# Patient Record
Sex: FEMALE | Race: BLACK OR AFRICAN AMERICAN | Employment: UNEMPLOYED | URBAN - METROPOLITAN AREA
[De-identification: names, ages, dates, MRNs, and addresses within clinical notes are randomized per-mention and may not be internally consistent; named-entity substitution may affect disease eponyms.]

---

## 2017-09-14 PROBLEM — F43.21 GRIEF REACTION: Status: ACTIVE | Noted: 2017-09-14

## 2017-09-14 PROBLEM — F33.1 MODERATE EPISODE OF RECURRENT MAJOR DEPRESSIVE DISORDER (HCC): Status: ACTIVE | Noted: 2017-09-14

## 2018-09-15 ENCOUNTER — HOSPITAL ENCOUNTER (EMERGENCY)
Age: 23
Discharge: HOME | End: 2018-09-15
Attending: EMERGENCY MEDICINE
Payer: COMMERCIAL

## 2018-09-15 VITALS
SYSTOLIC BLOOD PRESSURE: 135 MMHG | HEIGHT: 66 IN | OXYGEN SATURATION: 100 % | RESPIRATION RATE: 16 BRPM | WEIGHT: 151 LBS | BODY MASS INDEX: 24.27 KG/M2 | HEART RATE: 88 BPM | TEMPERATURE: 98.5 F | DIASTOLIC BLOOD PRESSURE: 84 MMHG

## 2018-09-15 DIAGNOSIS — N30.00 ACUTE CYSTITIS WITHOUT HEMATURIA: Primary | ICD-10-CM

## 2018-09-15 LAB
BACTERIA URNS QL MICRO: NORMAL /HPF
CASTS URNS QL MICRO: NORMAL /LPF
EPI CELLS #/AREA URNS HPF: NORMAL /HPF
HCG UR QL: POSITIVE
RBC #/AREA URNS HPF: NORMAL /HPF
WBC URNS QL MICRO: NORMAL /HPF

## 2018-09-15 PROCEDURE — 81003 URINALYSIS AUTO W/O SCOPE: CPT | Performed by: EMERGENCY MEDICINE

## 2018-09-15 PROCEDURE — 99284 EMERGENCY DEPT VISIT MOD MDM: CPT | Performed by: EMERGENCY MEDICINE

## 2018-09-15 PROCEDURE — 87491 CHLMYD TRACH DNA AMP PROBE: CPT

## 2018-09-15 PROCEDURE — 81015 MICROSCOPIC EXAM OF URINE: CPT

## 2018-09-15 PROCEDURE — 81025 URINE PREGNANCY TEST: CPT

## 2018-09-15 RX ORDER — NITROFURANTOIN 25; 75 MG/1; MG/1
100 CAPSULE ORAL 2 TIMES DAILY
Qty: 14 CAP | Refills: 0 | Status: SHIPPED | OUTPATIENT
Start: 2018-09-15 | End: 2018-09-22

## 2018-09-15 NOTE — ED TRIAGE NOTES
Patient reports painful urination with blood in urine last night. States that she had two menstrual periods last month, last week and second week in august. Patient unsure if she is pregnant.

## 2018-09-15 NOTE — ED PROVIDER NOTES
Patient is a 26 y.o. female presenting with hematuria. The history is provided by the patient. Blood in Urine The current episode started yesterday. The problem occurs every urination. The problem has been gradually improving. The quality of the pain is described as burning and stabbing. The pain is moderate. There has been no fever. She is sexually active. There is no history of pyelonephritis. Pertinent negatives include no chills, no sweats, no nausea, no vomiting, no discharge, no frequency, no hesitancy, no flank pain and no abdominal pain. It is unknown if the patient is pregnant. She has tried nothing for the symptoms. Her past medical history is significant for recurrent UTIs. Past medical history comments: bbacterial vaginosis. History reviewed. No pertinent past medical history. History reviewed. No pertinent surgical history. History reviewed. No pertinent family history. Social History Social History  Marital status: SINGLE Spouse name: N/A  
 Number of children: N/A  
 Years of education: N/A Occupational History  Not on file. Social History Main Topics  Smoking status: Current Every Day Smoker Packs/day: 0.25 Years: 4.00  Smokeless tobacco: Current User Comment: on patches  Alcohol use Yes Comment: occ beer once a month  Drug use: No  
 Sexual activity: No  
 
Other Topics Concern  Not on file Social History Narrative ALLERGIES: Review of patient's allergies indicates no known allergies. Review of Systems Constitutional: Negative for chills and fever. HENT: Negative for congestion, ear pain and rhinorrhea. Eyes: Negative for photophobia and discharge. Respiratory: Negative for cough and shortness of breath. Cardiovascular: Negative for chest pain and palpitations. Gastrointestinal: Negative for abdominal pain, constipation, diarrhea, nausea and vomiting. Endocrine: Negative for cold intolerance and heat intolerance. Genitourinary: Negative for dysuria, flank pain, frequency and hesitancy. Musculoskeletal: Negative for arthralgias, myalgias and neck pain. Skin: Negative for rash and wound. Allergic/Immunologic: Negative for environmental allergies and food allergies. Neurological: Negative for syncope and headaches. Hematological: Negative for adenopathy. Does not bruise/bleed easily. Psychiatric/Behavioral: Negative for dysphoric mood. The patient is not nervous/anxious. All other systems reviewed and are negative. Vitals:  
 09/15/18 0740 BP: 135/84 Pulse: 88 Resp: 16 Temp: 98.5 °F (36.9 °C) SpO2: 100% Weight: 68.5 kg (151 lb) Height: 5' 6\" (1.676 m) Physical Exam  
Constitutional: She is oriented to person, place, and time. She appears well-developed and well-nourished. She appears distressed. HENT:  
Head: Normocephalic and atraumatic. Mouth/Throat: Oropharynx is clear and moist.  
Eyes: Conjunctivae and EOM are normal. Pupils are equal, round, and reactive to light. Right eye exhibits no discharge. Left eye exhibits no discharge. No scleral icterus. Neck: Normal range of motion. Neck supple. No thyromegaly present. Cardiovascular: Normal rate, regular rhythm, normal heart sounds and intact distal pulses. Exam reveals no gallop and no friction rub. No murmur heard. Pulmonary/Chest: Effort normal and breath sounds normal. No respiratory distress. She has no wheezes. She exhibits no tenderness. Abdominal: Soft. Bowel sounds are normal. She exhibits no distension. There is no hepatosplenomegaly. There is no tenderness. There is no rebound and no guarding. No hernia. Musculoskeletal: Normal range of motion. She exhibits no edema or tenderness. Neurological: She is alert and oriented to person, place, and time. No cranial nerve deficit. She exhibits normal muscle tone. Skin: Skin is warm and dry. No rash noted. She is not diaphoretic. No erythema. Psychiatric: She has a normal mood and affect. Her behavior is normal. Judgment and thought content normal.  
Nursing note and vitals reviewed. MDM Number of Diagnoses or Management Options Acute cystitis without hematuria: new and requires workup Diagnosis management comments: Last menstrual period somewhere between 2 and 3 weeks ago. Not currently on any birth control 8:18 AM 
Urine pregnancy test positive. Today in the ER Amount and/or Complexity of Data Reviewed Clinical lab tests: ordered and reviewed Review and summarize past medical records: yes Risk of Complications, Morbidity, and/or Mortality Presenting problems: moderate Diagnostic procedures: low Management options: low General comments: Elements of this note have been dictated via voice recognition software. Text and phrases may be limited by the accuracy of the software. The chart has been reviewed, but errors may still be present. Patient Progress Patient progress: stable ED Course Procedures

## 2018-09-15 NOTE — DISCHARGE INSTRUCTIONS
Urinary Tract Infection in Pregnancy: Care Instructions  Your Care Instructions    A urinary tract infection, or UTI, is an infection of the bladder and other urinary structures. Most UTIs occur in the bladder. They often cause pain or burning when you urinate. UTI is the most common bacterial infection in pregnancy. If untreated, a UTI could lead to problems such as a kidney infection or  labor. Most UTIs can be cured with antibiotics. Your doctor will prescribe an antibiotic that is safe during pregnancy. Be sure to finish your medicine so that the infection does not spread to your kidneys. Follow-up care is a key part of your treatment and safety. Be sure to make and go to all appointments, and call your doctor if you are having problems. It's also a good idea to know your test results and keep a list of the medicines you take. How can you care for yourself at home? · Take your antibiotics as directed. Do not stop taking them just because you feel better. You need to take the full course of antibiotics. · Drink extra water and other fluids for the next day or two. This will help wash out the bacteria causing the infection. If you have kidney, heart, or liver disease and have to limit fluids, talk with your doctor before you increase the amount of fluids you drink. · Do not drink alcohol. · Urinate often. Try to empty your bladder each time. Preventing UTIs  · Drink plenty of fluids, enough so that your urine is light yellow or clear like water. This helps you urinate often, which clears bacteria from your system. If you have kidney, heart, or liver disease and have to limit fluids, talk with your doctor before you increase the amount of fluids you drink. · Urinate when you first have the urge. · Urinate right after you have sex. This is the best way for women to avoid UTIs. · When going to the bathroom, wipe from front to back to keep bacteria from entering the vagina or urethra.   When should you call for help? Call your doctor now or seek immediate medical care if:    · You have symptoms of a worse urinary tract infection. These may include:  ¨ Pain or burning when you urinate. ¨ A frequent need to urinate without being able to pass much urine. ¨ Pain in the flank, which is just below the rib cage and above the waist on either side of the back. ¨ Blood in your urine. ¨ A fever.    Watch closely for changes in your health, and be sure to contact your doctor if:    · You do not get better as expected. Where can you learn more? Go to http://naeem-dotty.info/. Enter M982 in the search box to learn more about \"Urinary Tract Infection in Pregnancy: Care Instructions. \"  Current as of: November 21, 2017  Content Version: 11.7  © 8604-4785 [employer]. Care instructions adapted under license by [employer] (which disclaims liability or warranty for this information). If you have questions about a medical condition or this instruction, always ask your healthcare professional. [place] [address] any warranty or liability for your use of this information.

## 2018-09-15 NOTE — ED NOTES
I have reviewed discharge instructions with the patient. The patient verbalized understanding. Patient left ED via Discharge Method: ambulatory to Home with self Opportunity for questions and clarification provided. Patient given 2 scripts. To continue your aftercare when you leave the hospital, you may receive an automated call from our care team to check in on how you are doing. This is a free service and part of our promise to provide the best care and service to meet your aftercare needs.  If you have questions, or wish to unsubscribe from this service please call [phone]. Thank you for Choosing our [facility] Department.

## 2018-09-18 LAB
C TRACH RRNA SPEC QL NAA+PROBE: NEGATIVE
N GONORRHOEA RRNA SPEC QL NAA+PROBE: NEGATIVE
SPECIMEN SOURCE: NORMAL

## 2018-11-12 PROBLEM — F33.1 MODERATE EPISODE OF RECURRENT MAJOR DEPRESSIVE DISORDER (HCC): Status: RESOLVED | Noted: 2017-09-14 | Resolved: 2018-11-12

## 2018-11-12 PROBLEM — Z86.59 H/O: DEPRESSION: Status: ACTIVE | Noted: 2018-11-12

## 2018-11-12 PROBLEM — M41.9 SCOLIOSIS: Status: ACTIVE | Noted: 2018-11-12

## 2018-11-12 PROBLEM — F43.21 GRIEF REACTION: Status: RESOLVED | Noted: 2017-09-14 | Resolved: 2018-11-12

## 2018-11-12 PROBLEM — D48.1: Status: ACTIVE | Noted: 2018-11-12

## 2018-11-12 PROBLEM — Z34.90 PREGNANCY: Status: ACTIVE | Noted: 2018-11-12

## 2018-11-14 PROBLEM — R76.8 POSITIVE ANA (ANTINUCLEAR ANTIBODY): Status: ACTIVE | Noted: 2018-11-14

## 2018-11-14 PROBLEM — Z36.82 NUCHAL TRANSLUCENCY OF FETUS ON PRENATAL ULTRASOUND: Status: ACTIVE | Noted: 2018-11-14

## 2018-11-14 PROBLEM — O35.2XX0 HEREDITARY DISEASE IN FAMILY POSSIBLY AFFECTING FETUS, AFFECTING MANAGEMENT OF MOTHER, ANTEPARTUM CONDITION OR COMPLICATION: Status: ACTIVE | Noted: 2018-11-14

## 2018-11-14 PROBLEM — Z82.69: Status: ACTIVE | Noted: 2018-11-12

## 2019-03-29 ENCOUNTER — HOSPITAL ENCOUNTER (INPATIENT)
Age: 24
LOS: 1 days | Discharge: ANOTHER HOSPITAL | DRG: 833 | End: 2019-03-30
Attending: OBSTETRICS & GYNECOLOGY | Admitting: OBSTETRICS & GYNECOLOGY
Payer: COMMERCIAL

## 2019-03-29 PROBLEM — O26.893 PELVIC PAIN IN ANTEPARTUM PERIOD IN THIRD TRIMESTER: Status: ACTIVE | Noted: 2019-03-29

## 2019-03-29 PROBLEM — R10.2 PELVIC PAIN IN ANTEPARTUM PERIOD IN THIRD TRIMESTER: Status: ACTIVE | Noted: 2019-03-29

## 2019-03-29 PROBLEM — O60.03 PRETERM LABOR IN THIRD TRIMESTER: Status: ACTIVE | Noted: 2019-03-29

## 2019-03-29 PROBLEM — O60.03 PRETERM LABOR IN THIRD TRIMESTER WITHOUT DELIVERY: Status: ACTIVE | Noted: 2019-03-29

## 2019-03-29 LAB
ALBUMIN SERPL-MCNC: 3.1 G/DL (ref 3.5–5)
ALBUMIN/GLOB SERPL: 0.9 {RATIO}
ALP SERPL-CCNC: 122 U/L (ref 50–130)
ALT SERPL-CCNC: 18 U/L (ref 12–65)
ANION GAP SERPL CALC-SCNC: 9 MMOL/L
AST SERPL-CCNC: 18 U/L (ref 15–37)
BACTERIA SPEC CULT: NORMAL
BASOPHILS # BLD: 0 K/UL (ref 0–0.2)
BASOPHILS NFR BLD: 0 % (ref 0–2)
BILIRUB SERPL-MCNC: 0.2 MG/DL (ref 0.2–1.1)
BUN SERPL-MCNC: 8 MG/DL (ref 6–23)
CALCIUM SERPL-MCNC: 8.9 MG/DL (ref 8.3–10.4)
CHLORIDE SERPL-SCNC: 108 MMOL/L (ref 98–107)
CO2 SERPL-SCNC: 20 MMOL/L (ref 21–32)
CREAT SERPL-MCNC: 0.5 MG/DL (ref 0.6–1)
DIFFERENTIAL METHOD BLD: ABNORMAL
EOSINOPHIL # BLD: 0 K/UL (ref 0–0.8)
EOSINOPHIL NFR BLD: 0 % (ref 0.5–7.8)
ERYTHROCYTE [DISTWIDTH] IN BLOOD BY AUTOMATED COUNT: 12.4 % (ref 11.9–14.6)
GLOBULIN SER CALC-MCNC: 3.5 G/DL (ref 2.3–3.5)
GLUCOSE SERPL-MCNC: 75 MG/DL (ref 65–100)
GLUCOSE, GLUUPC: NEGATIVE
HCT VFR BLD AUTO: 40.1 % (ref 35.8–46.3)
HGB BLD-MCNC: 13.8 G/DL (ref 11.7–15.4)
IMM GRANULOCYTES # BLD AUTO: 0.1 K/UL (ref 0–0.5)
IMM GRANULOCYTES NFR BLD AUTO: 1 % (ref 0–5)
KETONES UR-MCNC: NEGATIVE MG/DL
LYMPHOCYTES # BLD: 2.3 K/UL (ref 0.5–4.6)
LYMPHOCYTES NFR BLD: 18 % (ref 13–44)
MCH RBC QN AUTO: 31.4 PG (ref 26.1–32.9)
MCHC RBC AUTO-ENTMCNC: 34.4 G/DL (ref 31.4–35)
MCV RBC AUTO: 91.3 FL (ref 79.6–97.8)
MONOCYTES # BLD: 1.3 K/UL (ref 0.1–1.3)
MONOCYTES NFR BLD: 10 % (ref 4–12)
NEUTS SEG # BLD: 8.9 K/UL (ref 1.7–8.2)
NEUTS SEG NFR BLD: 70 % (ref 43–78)
NRBC # BLD: 0 K/UL (ref 0–0.2)
PLATELET # BLD AUTO: 163 K/UL (ref 150–450)
PMV BLD AUTO: 11.7 FL (ref 9.4–12.3)
POTASSIUM SERPL-SCNC: 3.9 MMOL/L (ref 3.5–5.1)
PROT SERPL-MCNC: 6.6 G/DL
PROT UR QL: NEGATIVE
RBC # BLD AUTO: 4.39 M/UL (ref 4.05–5.2)
SERVICE CMNT-IMP: NORMAL
SODIUM SERPL-SCNC: 137 MMOL/L (ref 136–145)
WBC # BLD AUTO: 12.7 K/UL (ref 4.3–11.1)

## 2019-03-29 PROCEDURE — 59025 FETAL NON-STRESS TEST: CPT

## 2019-03-29 PROCEDURE — 81002 URINALYSIS NONAUTO W/O SCOPE: CPT | Performed by: OBSTETRICS & GYNECOLOGY

## 2019-03-29 PROCEDURE — 86900 BLOOD TYPING SEROLOGIC ABO: CPT

## 2019-03-29 PROCEDURE — 87491 CHLMYD TRACH DNA AMP PROBE: CPT

## 2019-03-29 PROCEDURE — 87653 STREP B DNA AMP PROBE: CPT

## 2019-03-29 PROCEDURE — 65270000029 HC RM PRIVATE

## 2019-03-29 PROCEDURE — 99283 EMERGENCY DEPT VISIT LOW MDM: CPT

## 2019-03-29 PROCEDURE — 87081 CULTURE SCREEN ONLY: CPT

## 2019-03-29 PROCEDURE — 80053 COMPREHEN METABOLIC PANEL: CPT

## 2019-03-29 PROCEDURE — 85025 COMPLETE CBC W/AUTO DIFF WBC: CPT

## 2019-03-29 PROCEDURE — 74011250636 HC RX REV CODE- 250/636: Performed by: OBSTETRICS & GYNECOLOGY

## 2019-03-29 PROCEDURE — 87086 URINE CULTURE/COLONY COUNT: CPT

## 2019-03-29 RX ORDER — BETAMETHASONE SODIUM PHOSPHATE AND BETAMETHASONE ACETATE 3; 3 MG/ML; MG/ML
12 INJECTION, SUSPENSION INTRA-ARTICULAR; INTRALESIONAL; INTRAMUSCULAR; SOFT TISSUE EVERY 24 HOURS
Status: DISCONTINUED | OUTPATIENT
Start: 2019-03-29 | End: 2019-03-30 | Stop reason: HOSPADM

## 2019-03-29 RX ORDER — MAGNESIUM SULFATE HEPTAHYDRATE 40 MG/ML
2 INJECTION, SOLUTION INTRAVENOUS ONCE
Status: COMPLETED | OUTPATIENT
Start: 2019-03-29 | End: 2019-03-29

## 2019-03-29 RX ORDER — ZOLPIDEM TARTRATE 5 MG/1
5 TABLET ORAL
Status: DISCONTINUED | OUTPATIENT
Start: 2019-03-29 | End: 2019-03-30 | Stop reason: HOSPADM

## 2019-03-29 RX ORDER — SODIUM CHLORIDE, SODIUM LACTATE, POTASSIUM CHLORIDE, CALCIUM CHLORIDE 600; 310; 30; 20 MG/100ML; MG/100ML; MG/100ML; MG/100ML
125 INJECTION, SOLUTION INTRAVENOUS CONTINUOUS
Status: DISCONTINUED | OUTPATIENT
Start: 2019-03-29 | End: 2019-03-30 | Stop reason: HOSPADM

## 2019-03-29 RX ORDER — MAGNESIUM SULFATE HEPTAHYDRATE 40 MG/ML
2 INJECTION, SOLUTION INTRAVENOUS CONTINUOUS
Status: DISCONTINUED | OUTPATIENT
Start: 2019-03-29 | End: 2019-03-30 | Stop reason: HOSPADM

## 2019-03-29 RX ORDER — MAG HYDROX/ALUMINUM HYD/SIMETH 200-200-20
30 SUSPENSION, ORAL (FINAL DOSE FORM) ORAL
Status: DISCONTINUED | OUTPATIENT
Start: 2019-03-29 | End: 2019-03-30 | Stop reason: HOSPADM

## 2019-03-29 RX ORDER — ACETAMINOPHEN 325 MG/1
650 TABLET ORAL
Status: DISCONTINUED | OUTPATIENT
Start: 2019-03-29 | End: 2019-03-30 | Stop reason: HOSPADM

## 2019-03-29 RX ORDER — MAGNESIUM SULFATE HEPTAHYDRATE 40 MG/ML
4 INJECTION, SOLUTION INTRAVENOUS ONCE
Status: COMPLETED | OUTPATIENT
Start: 2019-03-29 | End: 2019-03-29

## 2019-03-29 RX ORDER — SODIUM CHLORIDE 0.9 % (FLUSH) 0.9 %
5-40 SYRINGE (ML) INJECTION AS NEEDED
Status: DISCONTINUED | OUTPATIENT
Start: 2019-03-29 | End: 2019-03-30 | Stop reason: HOSPADM

## 2019-03-29 RX ORDER — SODIUM CHLORIDE 0.9 % (FLUSH) 0.9 %
5-40 SYRINGE (ML) INJECTION EVERY 8 HOURS
Status: DISCONTINUED | OUTPATIENT
Start: 2019-03-29 | End: 2019-03-30 | Stop reason: HOSPADM

## 2019-03-29 RX ORDER — SODIUM CHLORIDE, SODIUM LACTATE, POTASSIUM CHLORIDE, CALCIUM CHLORIDE 600; 310; 30; 20 MG/100ML; MG/100ML; MG/100ML; MG/100ML
500 INJECTION, SOLUTION INTRAVENOUS CONTINUOUS
Status: DISPENSED | OUTPATIENT
Start: 2019-03-29 | End: 2019-03-29

## 2019-03-29 RX ORDER — DOCUSATE SODIUM 100 MG/1
100 CAPSULE, LIQUID FILLED ORAL 2 TIMES DAILY
Status: DISCONTINUED | OUTPATIENT
Start: 2019-03-30 | End: 2019-03-30 | Stop reason: HOSPADM

## 2019-03-29 RX ADMIN — SODIUM CHLORIDE, SODIUM LACTATE, POTASSIUM CHLORIDE, AND CALCIUM CHLORIDE 500 ML/HR: 600; 310; 30; 20 INJECTION, SOLUTION INTRAVENOUS at 22:20

## 2019-03-29 RX ADMIN — SODIUM CHLORIDE, SODIUM LACTATE, POTASSIUM CHLORIDE, AND CALCIUM CHLORIDE 125 ML/HR: 600; 310; 30; 20 INJECTION, SOLUTION INTRAVENOUS at 23:51

## 2019-03-29 RX ADMIN — MAGNESIUM SULFATE HEPTAHYDRATE 4 G: 40 INJECTION, SOLUTION INTRAVENOUS at 22:27

## 2019-03-29 RX ADMIN — BETAMETHASONE SODIUM PHOSPHATE AND BETAMETHASONE ACETATE 12 MG: 3; 3 INJECTION, SUSPENSION INTRA-ARTICULAR; INTRALESIONAL; INTRAMUSCULAR at 21:40

## 2019-03-29 RX ADMIN — MAGNESIUM SULFATE HEPTAHYDRATE 2 G/HR: 40 INJECTION, SOLUTION INTRAVENOUS at 23:13

## 2019-03-29 RX ADMIN — MAGNESIUM SULFATE IN WATER 2 G: 40 INJECTION, SOLUTION INTRAVENOUS at 23:01

## 2019-03-30 VITALS
HEART RATE: 109 BPM | OXYGEN SATURATION: 99 % | SYSTOLIC BLOOD PRESSURE: 149 MMHG | TEMPERATURE: 97.8 F | DIASTOLIC BLOOD PRESSURE: 73 MMHG

## 2019-03-30 LAB
ABO + RH BLD: NORMAL
BLOOD GROUP ANTIBODIES SERPL: NORMAL
SPECIMEN EXP DATE BLD: NORMAL

## 2019-03-30 NOTE — PROGRESS NOTES
SBAR report called to [facility] labor and delivery. Report given to Tri Rogers RN. Pt will go to Labor room 1 per [employer] Kendrick RN.

## 2019-03-30 NOTE — PROGRESS NOTES
Transport arrived. Thing[address]ra[address] 36 removed from soft non tender abd. Pt VS stable At this time 130/68, 98.2, 16, 99% room air, 106. Mag infusing @ 2g/hr and LR infusing @ 125/hr as ordered. Pumps unplugged and sent with pt. Transport will return to [facility]. Pastor catheter indwelling and draining clear straw colored urine when transferred to [facility]. 0117 pt on stretcher and leaving L and D with transport and her mother. All paperwork and medical records given to transport. Family educated on pts transfer and new location.

## 2019-03-30 NOTE — PROGRESS NOTES
SBAR received from Sydney Marquez RN. Care assumed. Mag bolus infusing. Pt in bed with family 2 bedside. POC reviewed, Pt voiced understanding. Shift assessment complete.  Will continue to monitor

## 2019-03-30 NOTE — H&P
History & Physical    Name: Fazal Ulloa MRN: [id number]  SSN: [id number]    YOB: 1995  Age: 22 y.o. Sex: female      Subjective:     Reason for Admission:  Pregnancy and  Labor    History of Present Illness: Ms. Sourav Cartagena is a 22 y.o.   with an estimated gestational age of 27w4d with Estimated Date of Delivery: 19. Patient complains of moderate contractions for 4 hours prior to arrival in [facility]. Pregnancy has been complicated by positive abelardo titer. . Patient denies chest pain, fever, headache , nausea and vomiting, right upper quadrant pain  , shortness of breath, swelling, vaginal bleeding  and vaginal leaking of fluid . OB History    Para Term  AB Living   1 0 0 0 0 0   SAB TAB Ectopic Molar Multiple Live Births   0 0 0 0 0 0      # Outcome Date GA Lbr Wally/2nd Weight Sex Delivery Anes PTL Lv   1 Current              Past Medical History:   Diagnosis Date    Anemia     Anxiety     Chlamydia     Depression     medication 2017-2017. doing well off meds    Headache     Scoliosis      No past surgical history on file. Social History     Occupational History    Not on file   Tobacco Use    Smoking status: Former Smoker     Packs/day: 0.25     Years: 4.00     Pack years: 1.00    Smokeless tobacco: Never Used    Tobacco comment: none since +pt   Substance and Sexual Activity    Alcohol use: No     Frequency: Never     Comment: occ beer once a month/ none since +pt    Drug use: Yes     Types: Marijuana     Comment: used marijuana 1 year ago-2017    Sexual activity: Yes     Partners: Male     Birth control/protection: None      Family History   Problem Relation Age of Onset    Hypertension Mother     Cancer Maternal Grandfather         bone    Breast Cancer Paternal Grandmother     Other Cousin         polydactyl       No Known Allergies  Prior to Admission medications    Medication Sig Start Date End Date Taking?  Authorizing Provider PNV Comb. No76-Iron,Carbonyl-FA (PNV 29-1) 29 mg iron- 1 mg tab Take 1 Tab by mouth daily. 3/12/19   Paul Wasserman MD      MEDS: PNV's    Review of Systems:  A comprehensive review of systems was negative except for that written in the History of Present Illness. Objective:     Vitals:    Vitals:    19 2050 19 2227   BP: 130/83 129/73   Pulse: 94 93   Temp: 97.8 °F (36.6 °C)       Temp (24hrs), Av.8 °F (36.6 °C), Min:97.8 °F (36.6 °C), Max:97.8 °F (36.6 °C)    BP  Min: 129/73  Max: 130/83     Physical Exam:  Patient without distress. Heart: Regular rate and rhythm  Lung: clear to auscultation throughout lung fields, no wheezes, no rales, no rhonchi and normal respiratory effort  Back: costovertebral angle tenderness absent  Abdomen: soft, nontender  Fundus: soft and non tender  Perineum: blood absent, amniotic fluid absent  Cervical Exam: 3 cm dilated    80% effaced    -2 station    Presenting Part: cephalic  Lower Extremities:  - Edema No   - Patellar Reflexes: 2+ bilaterally     Membranes:  Intact  Uterine Activity:  irreg every 2-5  Fetal Heart Rate:  Reactive     Skin- no rashes or changes  Psych- anxious    Lab/Data Review:  Recent Results (from the past 24 hour(s))   CBC WITH AUTOMATED DIFF    Collection Time: 19  9:28 PM   Result Value Ref Range    WBC 12.7 (H) 4.3 - 11.1 K/uL    RBC 4.39 4.05 - 5.2 M/uL    HGB 13.8 11.7 - 15.4 g/dL    HCT 40.1 35.8 - 46.3 %    MCV 91.3 79.6 - 97.8 FL    MCH 31.4 26.1 - 32.9 PG    MCHC 34.4 31.4 - 35.0 g/dL    RDW 12.4 11.9 - 14.6 %    PLATELET 156 278 - 174 K/uL    MPV 11.7 9.4 - 12.3 FL    ABSOLUTE NRBC 0.00 0.0 - 0.2 K/uL    DF AUTOMATED      NEUTROPHILS 70 43 - 78 %    LYMPHOCYTES 18 13 - 44 %    MONOCYTES 10 4.0 - 12.0 %    EOSINOPHILS 0 (L) 0.5 - 7.8 %    BASOPHILS 0 0.0 - 2.0 %    IMMATURE GRANULOCYTES 1 0.0 - 5.0 %    ABS. NEUTROPHILS 8.9 (H) 1.7 - 8.2 K/UL    ABS. LYMPHOCYTES 2.3 0.5 - 4.6 K/UL    ABS.  MONOCYTES 1.3 0.1 - 1.3 K/UL ABS. EOSINOPHILS 0.0 0.0 - 0.8 K/UL    ABS. BASOPHILS 0.0 0.0 - 0.2 K/UL    ABS. IMM. GRANS. 0.1 0.0 - 0.5 K/UL   TYPE & SCREEN    Collection Time: 19  9:28 PM   Result Value Ref Range    Crossmatch Expiration 2019     ABO/Rh(D) O POSITIVE     Antibody screen NEG    METABOLIC PANEL, COMPREHENSIVE    Collection Time: 19  9:28 PM   Result Value Ref Range    Sodium 137 136 - 145 mmol/L    Potassium 3.9 3.5 - 5.1 mmol/L    Chloride 108 (H) 98 - 107 mmol/L    CO2 20 (L) 21 - 32 mmol/L    Anion gap 9 mmol/L    Glucose 75 65 - 100 mg/dL    BUN 8 6 - 23 MG/DL    Creatinine 0.50 (L) 0.6 - 1.0 MG/DL    GFR est AA >60 >60 ml/min/1.73m2    GFR est non-AA >60 ml/min/1.73m2    Calcium 8.9 8.3 - 10.4 MG/DL    Bilirubin, total 0.2 0.2 - 1.1 MG/DL    ALT (SGPT) 18 12 - 65 U/L    AST (SGOT) 18 15 - 37 U/L    Alk. phosphatase 122 50 - 130 U/L    Protein, total 6.6 g/dL    Albumin 3.1 (L) 3.5 - 5.0 g/dL    Globulin 3.5 2.3 - 3.5 g/dL    A-G Ratio 0.9         Assessment and Plan:     Principal Problem:     labor in third trimester without delivery (3/29/2019)    Active Problems:    Pelvic pain in antepartum period in third trimester (3/29/2019)       labor in third trimester (3/29/2019)       -  Labor:  48 hour course of steroids to promote fetal lung maturity. Obtain Rectovaginal culture for Group B Streptococcal.   Start Magnesium sulfate for tocolysis if other tocolytics fail.   Continuous Tocodynamometer   Neonatology consult   GBS - negative  Urine culture, gc/chlam- sent/ pending  Vertex confirmed by US  Signed By:  Alejandro James MD     2019

## 2019-03-30 NOTE — PROGRESS NOTES
Transfer note: called to see patient after admission a few hours ago for this 23 yo  at 31 weeks and 5 days presenting with PTL and dilated to 3 cm on admission, now 4 cm after receiving 6 gram Mag bolus and 2grams/hour. Still having frequent UCs and pain score of 8. No SROM, and recheck of cervix 10 minutes ago confirms 4-5 cm. U/S shows cephalic presentation. Feel appropriate to transfer to [facility] for  care not available here. Pt informed and understands, agrees with transfer. Patient is deemed stable for transfer at this time. Accepting M doctor at [facility] is Dr Deuce Barrett. I spoke with her, history given and she agrees to accept care of patient. Patient counseled, examined and history reviewed for 25 minutes regarding her condition/diagnosis and disposition with greater than 50% of the time spent in this way, time spent with phone calls and transportation, paperwork etc included.

## 2019-03-30 NOTE — PROGRESS NOTES
Dr. Aicha Pete updated on pt status. SVE 4/80/-2.  Will come in to transfer pt to [facility]

## 2019-04-01 LAB
BACTERIA SPEC CULT: NORMAL
SERVICE CMNT-IMP: NORMAL

## 2019-04-02 LAB
BACTERIA SPEC CULT: NORMAL
SERVICE CMNT-IMP: NORMAL

## 2019-04-15 PROBLEM — O26.893 PELVIC PAIN IN ANTEPARTUM PERIOD IN THIRD TRIMESTER: Status: RESOLVED | Noted: 2019-03-29 | Resolved: 2019-04-15

## 2019-04-15 PROBLEM — O60.03 PRETERM LABOR IN THIRD TRIMESTER: Status: RESOLVED | Noted: 2019-03-29 | Resolved: 2019-04-15

## 2019-04-15 PROBLEM — R10.2 PELVIC PAIN IN ANTEPARTUM PERIOD IN THIRD TRIMESTER: Status: RESOLVED | Noted: 2019-03-29 | Resolved: 2019-04-15

## 2019-04-17 PROBLEM — O36.5930 POOR FETAL GROWTH AFFECTING MANAGEMENT OF MOTHER IN THIRD TRIMESTER: Status: ACTIVE | Noted: 2019-04-17

## 2019-04-17 PROBLEM — Z36.82 NUCHAL TRANSLUCENCY OF FETUS ON PRENATAL ULTRASOUND: Status: RESOLVED | Noted: 2018-11-14 | Resolved: 2019-04-17

## 2019-04-17 PROBLEM — O99.343 DEPRESSION AFFECTING PREGNANCY IN THIRD TRIMESTER, ANTEPARTUM: Status: ACTIVE | Noted: 2018-11-12

## 2019-04-17 PROBLEM — F32.A DEPRESSION AFFECTING PREGNANCY IN THIRD TRIMESTER, ANTEPARTUM: Status: ACTIVE | Noted: 2018-11-12

## 2019-05-07 ENCOUNTER — HOSPITAL ENCOUNTER (INPATIENT)
Age: 24
LOS: 2 days | Discharge: HOME | End: 2019-05-09
Attending: OBSTETRICS & GYNECOLOGY | Admitting: OBSTETRICS & GYNECOLOGY
Payer: COMMERCIAL

## 2019-05-07 ENCOUNTER — ANESTHESIA (OUTPATIENT)
Dept: LABOR AND DELIVERY | Age: 24
End: 2019-05-07
Payer: COMMERCIAL

## 2019-05-07 ENCOUNTER — ANESTHESIA EVENT (OUTPATIENT)
Dept: LABOR AND DELIVERY | Age: 24
End: 2019-05-07
Payer: COMMERCIAL

## 2019-05-07 DIAGNOSIS — O42.00 PREMATURE RUPTURE OF MEMBRANES WITH ONSET OF LABOR WITHIN 24 HOURS OF RUPTURE, UNSPECIFIED GESTATIONAL AGE: Primary | ICD-10-CM

## 2019-05-07 PROBLEM — O26.893 VAGINAL DISCHARGE DURING PREGNANCY IN THIRD TRIMESTER: Status: ACTIVE | Noted: 2019-05-07

## 2019-05-07 PROBLEM — O42.90 PROM (PREMATURE RUPTURE OF MEMBRANES): Status: ACTIVE | Noted: 2019-05-07

## 2019-05-07 PROBLEM — N89.8 VAGINAL DISCHARGE DURING PREGNANCY IN THIRD TRIMESTER: Status: ACTIVE | Noted: 2019-05-07

## 2019-05-07 LAB
ABO + RH BLD: NORMAL
ARTERIAL PATENCY WRIST A: ABNORMAL
ARTERIAL PATENCY WRIST A: ABNORMAL
BASE DEFICIT BLD-SCNC: 7 MMOL/L
BASE DEFICIT BLD-SCNC: 7 MMOL/L
BDY SITE: ABNORMAL
BDY SITE: ABNORMAL
BLOOD GROUP ANTIBODIES SERPL: NORMAL
BODY TEMPERATURE: 98.6
BODY TEMPERATURE: 98.6
CO2 BLD-SCNC: 21 MMOL/L
CO2 BLD-SCNC: 26 MMOL/L
COLLECT TIME,HTIME: 2007
COLLECT TIME,HTIME: 2007
ERYTHROCYTE [DISTWIDTH] IN BLOOD BY AUTOMATED COUNT: 12.8 % (ref 11.9–14.6)
GAS FLOW.O2 O2 DELIVERY SYS: ABNORMAL L/MIN
GAS FLOW.O2 O2 DELIVERY SYS: ABNORMAL L/MIN
HCO3 BLD-SCNC: 24.2 MMOL/L (ref 22–26)
HCO3 BLDV-SCNC: 19.3 MMOL/L (ref 23–28)
HCT VFR BLD AUTO: 40.6 % (ref 35.8–46.3)
HGB BLD-MCNC: 13.8 G/DL (ref 11.7–15.4)
MCH RBC QN AUTO: 30.9 PG (ref 26.1–32.9)
MCHC RBC AUTO-ENTMCNC: 34 G/DL (ref 31.4–35)
MCV RBC AUTO: 91 FL (ref 79.6–97.8)
NRBC # BLD: 0 K/UL (ref 0–0.2)
O2/TOTAL GAS SETTING VFR VENT: 21 %
O2/TOTAL GAS SETTING VFR VENT: 21 %
PCO2 BLDCO: 42 MMHG (ref 32–68)
PCO2 BLDCO: 72 MMHG (ref 32–68)
PH BLDCO: 7.13 [PH] (ref 7.15–7.38)
PH BLDCO: 7.27 [PH] (ref 7.15–7.38)
PLATELET # BLD AUTO: 136 K/UL (ref 150–450)
PMV BLD AUTO: 12 FL (ref 9.4–12.3)
PO2 BLDCO: 17 MMHG
PO2 BLDCO: 26 MMHG
RBC # BLD AUTO: 4.46 M/UL (ref 4.05–5.2)
SAO2 % BLD: 14 % (ref 95–98)
SAO2 % BLDV: 40 % (ref 65–88)
SERVICE CMNT-IMP: 1
SERVICE CMNT-IMP: 1
SERVICE CMNT-IMP: ABNORMAL
SERVICE CMNT-IMP: ABNORMAL
SPECIMEN EXP DATE BLD: NORMAL
SPECIMEN TYPE: ABNORMAL
SPECIMEN TYPE: ABNORMAL
WBC # BLD AUTO: 8.7 K/UL (ref 4.3–11.1)

## 2019-05-07 PROCEDURE — 74011250637 HC RX REV CODE- 250/637: Performed by: OBSTETRICS & GYNECOLOGY

## 2019-05-07 PROCEDURE — 75410000003 HC RECOV DEL/VAG/CSECN EA 0.5 HR

## 2019-05-07 PROCEDURE — 85027 COMPLETE CBC AUTOMATED: CPT

## 2019-05-07 PROCEDURE — 74011250636 HC RX REV CODE- 250/636: Performed by: OBSTETRICS & GYNECOLOGY

## 2019-05-07 PROCEDURE — 4A1HXCZ MONITORING OF PRODUCTS OF CONCEPTION, CARDIAC RATE, EXTERNAL APPROACH: ICD-10-PCS | Performed by: OBSTETRICS & GYNECOLOGY

## 2019-05-07 PROCEDURE — A4300 CATH IMPL VASC ACCESS PORTAL: HCPCS | Performed by: ANESTHESIOLOGY

## 2019-05-07 PROCEDURE — 77030014125 HC TY EPDRL BBMI -B: Performed by: ANESTHESIOLOGY

## 2019-05-07 PROCEDURE — 76060000078 HC EPIDURAL ANESTHESIA

## 2019-05-07 PROCEDURE — 74011000250 HC RX REV CODE- 250

## 2019-05-07 PROCEDURE — 77030018846 HC SOL IRR STRL H20 ICUM -A

## 2019-05-07 PROCEDURE — 77030011943

## 2019-05-07 PROCEDURE — 77030010848 HC CATH INTUTR PRSS KOLB -B

## 2019-05-07 PROCEDURE — 65270000029 HC RM PRIVATE

## 2019-05-07 PROCEDURE — 77030011945 HC CATH URIN INT ST MENT -A

## 2019-05-07 PROCEDURE — 75410000002 HC LABOR FEE PER 1 HR

## 2019-05-07 PROCEDURE — 74011250636 HC RX REV CODE- 250/636

## 2019-05-07 PROCEDURE — 99283 EMERGENCY DEPT VISIT LOW MDM: CPT

## 2019-05-07 PROCEDURE — 75410000000 HC DELIVERY VAGINAL/SINGLE

## 2019-05-07 PROCEDURE — 82803 BLOOD GASES ANY COMBINATION: CPT

## 2019-05-07 PROCEDURE — 86900 BLOOD TYPING SEROLOGIC ABO: CPT

## 2019-05-07 RX ORDER — OXYTOCIN/RINGER'S LACTATE 15/250 ML
250 PLASTIC BAG, INJECTION (ML) INTRAVENOUS ONCE
Status: ACTIVE | OUTPATIENT
Start: 2019-05-07 | End: 2019-05-07

## 2019-05-07 RX ORDER — SODIUM CHLORIDE 0.9 % (FLUSH) 0.9 %
5-40 SYRINGE (ML) INJECTION EVERY 8 HOURS
Status: DISCONTINUED | OUTPATIENT
Start: 2019-05-07 | End: 2019-05-07 | Stop reason: HOSPADM

## 2019-05-07 RX ORDER — ROPIVACAINE HYDROCHLORIDE 2 MG/ML
INJECTION, SOLUTION EPIDURAL; INFILTRATION; PERINEURAL
Status: DISCONTINUED | OUTPATIENT
Start: 2019-05-07 | End: 2019-05-07 | Stop reason: HOSPADM

## 2019-05-07 RX ORDER — FENTANYL CITRATE 50 UG/ML
INJECTION, SOLUTION INTRAMUSCULAR; INTRAVENOUS
Status: COMPLETED
Start: 2019-05-07 | End: 2019-05-07

## 2019-05-07 RX ORDER — ONDANSETRON 4 MG/1
4 TABLET, ORALLY DISINTEGRATING ORAL
Status: ACTIVE | OUTPATIENT
Start: 2019-05-07 | End: 2019-05-08

## 2019-05-07 RX ORDER — LIDOCAINE HYDROCHLORIDE 10 MG/ML
1 INJECTION INFILTRATION; PERINEURAL
Status: DISCONTINUED | OUTPATIENT
Start: 2019-05-07 | End: 2019-05-07 | Stop reason: HOSPADM

## 2019-05-07 RX ORDER — IBUPROFEN 800 MG/1
800 TABLET ORAL
Status: DISCONTINUED | OUTPATIENT
Start: 2019-05-07 | End: 2019-05-09 | Stop reason: HOSPADM

## 2019-05-07 RX ORDER — SIMETHICONE 80 MG
80 TABLET,CHEWABLE ORAL
Status: DISCONTINUED | OUTPATIENT
Start: 2019-05-07 | End: 2019-05-09 | Stop reason: HOSPADM

## 2019-05-07 RX ORDER — SODIUM CHLORIDE 0.9 % (FLUSH) 0.9 %
5-40 SYRINGE (ML) INJECTION AS NEEDED
Status: DISCONTINUED | OUTPATIENT
Start: 2019-05-07 | End: 2019-05-07 | Stop reason: HOSPADM

## 2019-05-07 RX ORDER — ONDANSETRON 2 MG/ML
4 INJECTION INTRAMUSCULAR; INTRAVENOUS
Status: DISCONTINUED | OUTPATIENT
Start: 2019-05-07 | End: 2019-05-07

## 2019-05-07 RX ORDER — DOCUSATE SODIUM 100 MG/1
100 CAPSULE, LIQUID FILLED ORAL 2 TIMES DAILY
Status: DISCONTINUED | OUTPATIENT
Start: 2019-05-08 | End: 2019-05-09 | Stop reason: HOSPADM

## 2019-05-07 RX ORDER — LIDOCAINE HYDROCHLORIDE AND EPINEPHRINE 20; 5 MG/ML; UG/ML
INJECTION, SOLUTION EPIDURAL; INFILTRATION; INTRACAUDAL; PERINEURAL AS NEEDED
Status: DISCONTINUED | OUTPATIENT
Start: 2019-05-07 | End: 2019-05-07 | Stop reason: HOSPADM

## 2019-05-07 RX ORDER — OXYTOCIN/RINGER'S LACTATE 30/500 ML
0-20 PLASTIC BAG, INJECTION (ML) INTRAVENOUS
Status: DISCONTINUED | OUTPATIENT
Start: 2019-05-07 | End: 2019-05-07

## 2019-05-07 RX ORDER — OXYCODONE HYDROCHLORIDE 5 MG/1
5 TABLET ORAL
Status: DISCONTINUED | OUTPATIENT
Start: 2019-05-07 | End: 2019-05-09 | Stop reason: HOSPADM

## 2019-05-07 RX ORDER — DEXTROSE, SODIUM CHLORIDE, SODIUM LACTATE, POTASSIUM CHLORIDE, AND CALCIUM CHLORIDE 5; .6; .31; .03; .02 G/100ML; G/100ML; G/100ML; G/100ML; G/100ML
125 INJECTION, SOLUTION INTRAVENOUS CONTINUOUS
Status: DISCONTINUED | OUTPATIENT
Start: 2019-05-07 | End: 2019-05-07 | Stop reason: HOSPADM

## 2019-05-07 RX ORDER — BUTORPHANOL TARTRATE 2 MG/ML
1 INJECTION INTRAMUSCULAR; INTRAVENOUS
Status: DISCONTINUED | OUTPATIENT
Start: 2019-05-07 | End: 2019-05-07 | Stop reason: HOSPADM

## 2019-05-07 RX ORDER — DIPHENHYDRAMINE HCL 25 MG
25 CAPSULE ORAL
Status: DISCONTINUED | OUTPATIENT
Start: 2019-05-07 | End: 2019-05-09 | Stop reason: HOSPADM

## 2019-05-07 RX ORDER — FENTANYL CITRATE 50 UG/ML
INJECTION, SOLUTION INTRAMUSCULAR; INTRAVENOUS AS NEEDED
Status: DISCONTINUED | OUTPATIENT
Start: 2019-05-07 | End: 2019-05-07 | Stop reason: HOSPADM

## 2019-05-07 RX ORDER — MINERAL OIL
120 OIL (ML) ORAL
Status: COMPLETED | OUTPATIENT
Start: 2019-05-07 | End: 2019-05-07

## 2019-05-07 RX ORDER — LIDOCAINE HYDROCHLORIDE 20 MG/ML
JELLY TOPICAL
Status: DISCONTINUED | OUTPATIENT
Start: 2019-05-07 | End: 2019-05-07 | Stop reason: HOSPADM

## 2019-05-07 RX ADMIN — LIDOCAINE HYDROCHLORIDE AND EPINEPHRINE 6 ML: 20; 5 INJECTION, SOLUTION EPIDURAL; INFILTRATION; INTRACAUDAL; PERINEURAL at 18:41

## 2019-05-07 RX ADMIN — ONDANSETRON 4 MG: 2 INJECTION INTRAMUSCULAR; INTRAVENOUS at 15:18

## 2019-05-07 RX ADMIN — FENTANYL CITRATE 100 MCG: 50 INJECTION, SOLUTION INTRAMUSCULAR; INTRAVENOUS at 18:41

## 2019-05-07 RX ADMIN — OXYTOCIN 2 MILLI-UNITS/MIN: 10 INJECTION, SOLUTION INTRAMUSCULAR; INTRAVENOUS at 19:20

## 2019-05-07 RX ADMIN — ROPIVACAINE HYDROCHLORIDE 10 ML/HR: 2 INJECTION, SOLUTION EPIDURAL; INFILTRATION; PERINEURAL at 15:34

## 2019-05-07 RX ADMIN — MINERAL OIL 120 ML: 471.95 OIL ORAL at 20:00

## 2019-05-07 RX ADMIN — SODIUM CHLORIDE, SODIUM LACTATE, POTASSIUM CHLORIDE, CALCIUM CHLORIDE, AND DEXTROSE MONOHYDRATE 125 ML/HR: 600; 310; 30; 20; 5 INJECTION, SOLUTION INTRAVENOUS at 09:03

## 2019-05-07 RX ADMIN — OXYTOCIN 2 MILLI-UNITS/MIN: 10 INJECTION, SOLUTION INTRAMUSCULAR; INTRAVENOUS at 12:40

## 2019-05-07 NOTE — PROGRESS NOTES
EPIDURAL PLACEMENT Dr Gerry Parikh at bedside at [phone]. Assisted pt to sitting up on bedside at 1515. Timeout completed at [phone] with MD, JHON and myself at bedside. Test dose given at 1530. Negative reaction. Pt assisted to lying back in right  tilt position. See anesthesia record for details. See vital sign flow sheet for BP. Tolerated procedure well.

## 2019-05-07 NOTE — H&P
History & Physical    Name: Hill Dominguez MRN: [id number]  SSN: [id number]    YOB: 1995  Age: 22 y.o. Sex: female      Chief c/o: loss of fluid  Subjective:     Estimated Date of Delivery: 19  OB History    Para Term  AB Living   1 0 0 0 0 0   SAB TAB Ectopic Molar Multiple Live Births   0 0 0 0 0 0      # Outcome Date GA Lbr Wally/2nd Weight Sex Delivery Anes PTL Lv   1 Current                Ms. Worley Holter is admitted with pregnancy at 42w4d for prom. Prenatal course was complicated by  labor. Please see prenatal records for details. Past Medical History:   Diagnosis Date    Anemia     Anxiety     Chlamydia     Depression     medication 2017-2017. doing well off meds    Headache     Scoliosis      History reviewed. No pertinent surgical history. Social History     Occupational History    Not on file   Tobacco Use    Smoking status: Former Smoker     Packs/day: 0.25     Years: 4.00     Pack years: 1.00    Smokeless tobacco: Never Used    Tobacco comment: none since +pt   Substance and Sexual Activity    Alcohol use: No     Frequency: Never     Comment: occ beer once a month/ none since +pt    Drug use: Yes     Types: Marijuana     Comment: used marijuana 1 year ago-2017    Sexual activity: Yes     Partners: Male     Birth control/protection: None     Family History   Problem Relation Age of Onset    Hypertension Mother     Cancer Maternal Grandfather         bone    Breast Cancer Paternal Grandmother     Other Cousin         polydactyl       No Known Allergies  Prior to Admission medications    Medication Sig Start Date End Date Taking? Authorizing Provider   PNV Comb. No76-Iron,Carbonyl-FA (PNV 29-1) 29 mg iron- 1 mg tab Take 1 Tab by mouth daily. 3/12/19  Yes Kiran Mcdowell MD        Review of Systems: A comprehensive review of systems was negative except for that written in the HPI.     Objective:     Vitals:  Vitals:    19 0759 19 0801   Temp:  99.3 °F (37.4 °C)   Weight: 95.3 kg (210 lb)    Height: 5' 6\" (1.676 m)         Physical Exam:  Patient without distress. Heart: Regular rate and rhythm  Lung: clear to auscultation throughout lung fields, no wheezes, no rales, no rhonchi and normal respiratory effort  Back: costovertebral angle tenderness absent  Abdomen: soft, nontender  Fundus: soft and non tender  Perineum: blood absent, amniotic fluid present  Cervical Exam: 4 cm dilated    90% effaced    -2 station    Presenting Part: cephalic  Lower Extremities:  - Edema No   - Patellar Reflexes: 2+ bilaterally  Membranes:   Premature Rupture of Membranes; Amniotic Fluid: clear fluid  Fetal Heart Rate: Reactive    Prenatal Labs:   Lab Results   Component Value Date/Time    ABO/Rh(D) O POSITIVE 2019 09:28 PM    Rubella, External immune 2018    GrBStrep, External Neg 2019    HBsAg, External negative 2018    HIV, External non reactive 2018    RPR, External non reactive 2018    Gonorrhea, External negative 2018    Chlamydia, External negative 2018    ABO,Rh O positive 2018         Assessment/Plan:     Active Problems:    Vaginal discharge during pregnancy in third trimester (2019)       premature rupture of membranes (PPROM) with onset of labor within 24 hours of rupture in third trimester, antepartum (2019)         Plan: 23 yo G1 at 37w 1d. Admit for PPROM. Group B Strep was negative. Pt received steroids at 31 weeks for  labor. Hx iugr which has resolved with out of work and increased calories.

## 2019-05-07 NOTE — ANESTHESIA PREPROCEDURE EVALUATION
Relevant Problems No relevant active problems Anesthetic History No history of anesthetic complications Review of Systems / Medical History Patient summary reviewed and pertinent labs reviewed Pulmonary Within defined limits Neuro/Psych Headaches and psychiatric history Cardiovascular Exercise tolerance: >4 METS 
  
GI/Hepatic/Renal 
Within defined limits Endo/Other Within defined limits Other Findings Comments: Scoliosis Denies Coagulapathies Physical Exam 
 
Airway Mallampati: II 
TM Distance: 4 - 6 cm Neck ROM: normal range of motion Mouth opening: Normal 
 
 Cardiovascular Rhythm: regular Rate: normal 
 
 
 
 Dental 
No notable dental hx Pulmonary Breath sounds clear to auscultation Abdominal 
GI exam deferred Other Findings Anesthetic Plan ASA: 2 Anesthesia type: epidural 
 
 
 
 
 
Anesthetic plan and risks discussed with: Patient and Family

## 2019-05-07 NOTE — PROGRESS NOTES
Labor Progress Note Patient seen, fetal heart rate and contraction pattern evaluated, patient examined. Patient Vitals for the past 1 hrs: 
 BP Pulse 05/07/19 1551 133/70 98  
05/07/19 1544 119/57 (!) 102  
05/07/19 1543 113/59 (!) 102  
05/07/19 1542 126/57 (!) 110  
05/07/19 1540 125/61 100  
05/07/19 1539 154/64 (!) 115  
05/07/19 1538 132/71 (!) 101  
05/07/19 1535 126/59 (!) 113  
05/07/19 1534 136/65 (!) 121  
05/07/19 1533 146/72 (!) 125  
05/07/19 1532 137/74 (!) 106  
05/07/19 1531 139/69 (!) 129 Physical Exam: 
Cervical Exam:  8 cm dilated 100% effaced   
-2 station Membranes:  prior SROM Uterine Activity: Frequency: Every 2-3 minutes Fetal Heart Rate: reassuring Assessment/Plan: 
Reassuring fetal status, Continue plan for vaginal delivery, will re evaluate and decide if need to restart pitocin

## 2019-05-07 NOTE — PROGRESS NOTES
Labor Progress Note Patient seen, fetal heart rate and contraction pattern evaluated, patient examined. No data found. Physical Exam: 
Membranes:  Spontaneous Rupture of Membranes; Amniotic Fluid: clear fluid Uterine Activity: Rare UC's Fetal Heart Rate: reassuring Assessment/Plan: 
Reassuring fetal status, will start pitocin with SROM earlier without onset of labor

## 2019-05-07 NOTE — PROGRESS NOTES
Labor Progress Note Patient seen, fetal heart rate and contraction pattern evaluated, patient examined. No data found. Physical Exam: 
Cervical Exam:  6 cm dilated 90% effaced   
-3 station Uterine Activity: difficult to monitor Fetal Heart Rate: reassuring with variable shaped deceleration Assessment/Plan: 
Reassuring fetal status, FSE and IUPC placed. Patient very uncomfortable with contractions and difficult to place IUPC  will watch progress.

## 2019-05-07 NOTE — PROGRESS NOTES
Here to triage via EMS, states her water broke about 5 am. Clear fluid. Has been 4 cms and was treated for ptl at [facility] with magnesium. Dr Ame Streeter at bs. Sve: \"generous 4\" with a very noticeable wet glove. Will admit to 428, sbar to [employer]. Pt states she does not want vaccines for herself or the baby

## 2019-05-07 NOTE — PROGRESS NOTES
Dr. Dalton Earcarmelina notified of current SVE; orders received to just notify him when she is complete.

## 2019-05-07 NOTE — ANESTHESIA PROCEDURE NOTES
Epidural Block Start time: 5/7/2019 3:25 PM 
End time: 5/7/2019 3:30 PM 
Performed by: Lucille Hayes MD 
Authorized by: Lucille Hayes MD  
 
Pre-Procedure Indication: at surgeon's request, post-op pain management, procedure for pain and labor epidural   
Preanesthetic Checklist: patient identified, risks and benefits discussed, anesthesia consent, site marked, patient being monitored, timeout performed and anesthesia consent Timeout Time: 15:25 Epidural:  
Patient position:  Seated Prep region:  Lumbar Prep: Chlorhexidine Location:  L3-4 Needle and Epidural Catheter:  
Needle Type:  Tuohy Needle Gauge:  17 G Injection Technique:  Loss of resistance using saline Attempts:  1 Catheter Size:  19 G Catheter at Skin Depth (cm):  11 Depth in Epidural Space (cm):  6 Events: no blood with aspiration, no cerebrospinal fluid with aspiration, no paresthesia and negative aspiration test   
Test Dose:  Lidocaine 1.5% w/ epi and negative Assessment:  
Catheter Secured:  Tegaderm and tape Insertion:  Uncomplicated Patient tolerance:  Patient tolerated the procedure well with no immediate complications

## 2019-05-07 NOTE — PROGRESS NOTES
Straight cath and SVE per flow sheet; bed linen changed; pt repositioned to left hip tilt; FSE off scalp; external fetal monitor in place and tracing; will continue to monitor.

## 2019-05-08 PROCEDURE — 74011250637 HC RX REV CODE- 250/637: Performed by: OBSTETRICS & GYNECOLOGY

## 2019-05-08 PROCEDURE — 65270000029 HC RM PRIVATE

## 2019-05-08 RX ADMIN — OXYCODONE HYDROCHLORIDE 5 MG: 5 TABLET ORAL at 07:54

## 2019-05-08 RX ADMIN — OXYCODONE HYDROCHLORIDE 5 MG: 5 TABLET ORAL at 21:04

## 2019-05-08 RX ADMIN — IBUPROFEN 800 MG: 800 TABLET ORAL at 11:44

## 2019-05-08 RX ADMIN — IBUPROFEN 800 MG: 800 TABLET ORAL at 17:47

## 2019-05-08 RX ADMIN — DOCUSATE SODIUM 100 MG: 100 CAPSULE, LIQUID FILLED ORAL at 07:55

## 2019-05-08 RX ADMIN — IBUPROFEN 800 MG: 800 TABLET ORAL at 05:59

## 2019-05-08 RX ADMIN — DOCUSATE SODIUM 100 MG: 100 CAPSULE, LIQUID FILLED ORAL at 17:47

## 2019-05-08 NOTE — PROGRESS NOTES
Assisted pt in ambulating to bathroom for the second time. Pt voided 800 ml. While cleaning up, pt passed golf ball sized clot. Helped pt in cleaning up and assisting back to bed. Assessed pt fundus. Fundus firm at U with no trickle. Educated pt to call out if pad is saturated within an hour or she passess another golf ball sized clot. Pt verbalizes understanding.

## 2019-05-08 NOTE — LACTATION NOTE

## 2019-05-08 NOTE — ROUTINE PROCESS
SBAR IN Report: Mother Verbal report received from Rosie Leroy (full name & credentials) on this patient, who is now being transferred from L&D (unit) for routine progression of care. The patient is not wearing a green \"Anesthesia-Duramorph\" band. Report consisted of patient's Situation, Background, Assessment and Recommendations (SBAR).  ID bands were compared with the identification form, and verified with the patient and transferring nurse. Information from the SBAR, Kardex and Procedure Summary and the [facility] Report was reviewed with the transferring nurse; opportunity for questions and clarification provided.

## 2019-05-08 NOTE — PROGRESS NOTES
Chart reviewed- first time parent with history of depression/anxiety.  met with family and provided education on [facility].  Family declined referral for home visit as she is currently receiving support through the [facility]. Patient does not have a PCP - list of PCPs provided.  did not inquire about patient's history of depression/anxiety as there were multiple visitors in the room. Patient was provided informational packet on  mood disorder education/resources. Family receptive to receiving information and denied any additional needs from . Family has 's contact information should any needs/questions arise. Carlton Collier [address] 35

## 2019-05-08 NOTE — ROUTINE PROCESS
SBAR OUT Report: Mother Verbal report given to Mitali Tellez RN (full name & credentials) on this patient, who is now being transferred to MIU (unit) for routine progression of care. The patient is not wearing a green \"Anesthesia-Duramorph\" band. Report consisted of patient's Situation, Background, Assessment and Recommendations (SBAR). [place] ID bands were compared with the identification form, and verified with the patient and receiving nurse. Information from the SBAR and the [address] [facility] Report was reviewed with the receiving nurse; opportunity for questions and clarification provided.

## 2019-05-08 NOTE — PROGRESS NOTES
Admission assessment complete as noted. Patient oriented to room and unit. Plan of care reviewed and patient verbalizes understanding. Questions encouraged and answered. Patent encouraged to call for needs or concerns. Safety Teaching reviewed:  
1. Hand hygiene prior to handling the infant. 2. Bracelets with matching numbers are placed on mother and infant 3. An infant security tag  [facility]) is placed on the infant's ankle and monitored 4. All OB nurses wear pink Employee badges - do not give your baby to anyone without proper identification. 5. Never leave the baby alone in the room. 6. The infant should be placed on their back to sleep. on a firm mattress. No toys should be placed in the crib. (safe sleep video offered to view) 7. Never shake the baby (video offered to view) 8. Infant fall prevention - do not sleep with the baby, and place the baby in the crib while ambulating. 5. Mother and Baby Care booklet given to Mother. Received call from Kofi with update on how his BP has been running over the weekend.   Patient reports that only time he had a bad BP was when he talked with us on Friday.   Otherwise readings are as follows:  Friday afternoon 145/86  Saturday- /77 /72  Sunday- /67 /75  Today was 125/73.   Patient feels good.  Has been making sure to drink enough water and that has alleviated his lightheadedness in the morning.   Overall feels better.

## 2019-05-08 NOTE — PROGRESS NOTES
Post-Partum Day Number 1 Progress Note Patient doing well post-partum without significant complaint. Voiding withour difficulty, normal lochia. Vitals:   
Patient Vitals for the past 8 hrs: 
 BP Temp Pulse Resp SpO2  
19 0836 107/55 98.4 °F (36.9 °C) (!) 101 18 98 % 19 0611  99.5 °F (37.5 °C)     
19 0555 113/71 100 °F (37.8 °C) (!) 104 17 95 % 19 0313  99.1 °F (37.3 °C)    Temp (24hrs), Av °F (37.2 °C), Min:98.3 °F (36.8 °C), Max:100 °F (37.8 °C) Vital signs stable, afebrile. Exam:  Patient without distress. Abdomen soft, fundus firm at level of umbilicus, nontender Lower extremities are negative for swelling, cords or tenderness. Lab/Data Review: All lab results for the last 24 hours reviewed. Assessment and Plan:  Patient appears to be having uncomplicated post-partum course. Continue routine perineal care and maternal education. Plan discharge tomorrow if no problems occur.

## 2019-05-08 NOTE — LACTATION NOTE
This note was copied from a baby's chart. Lactation visit. First time mom. 37 week infant. Baby has NOT latched well since birth per mom, sucked only a few times at delivery. Attempts only since then. Baby not yet 24 hours old. Mom attempting now. Baby tongue thrusting at rest. Everted nipples. Showed mom supportive hold in football hold on left breast. Baby would open minimally, no latch. Discussed pumping option since baby 37 week gestation and also has not latched since birth, mom agreeable. Pump set up with full instruction on use. Showed mom hands on pumping technique. Mom pumped 0. Tried to hand express post pumping, no colostrum returned. Will need to monitor very closely to see how colostrum volume progresses. Encouraged to continue with latch attempts but if baby not latching well, need to pump every 3 hours and feed colostrum. Will need to watch output and weight loss closely. RN updated.

## 2019-05-08 NOTE — PROGRESS NOTES
Pt. Resting skin to skin with infant. Plans on breastfeeding. Family at bedside visiting. Fundal checks being performed q15min. Pt. States pain 0/10.

## 2019-05-08 NOTE — L&D DELIVERY NOTE
Delivery Summary    Patient: Khang Dover MRN: [id number]  SSN: [id number]    YOB: 1995  Age: 22 y.o. Sex: female       Information for the patient's :  Cristiano Rendon [[id number]]       Labor Events:    Labor: No    Steroids: Full Course   Cervical Ripening Date/Time:       Cervical Ripening Type: None   Antibiotics During Labor: No   Rupture Identifier:      Rupture Date/Time: 2019 5:00 AM   Rupture Type: SROM   Amniotic Fluid Volume: Moderate    Amniotic Fluid Description:      Amniotic Fluid Odor: None    Induction: None       Induction Date/Time:        Indications for Induction:      Augmentation: Oxytocin   Augmentation Date/Time: 831:78 PM   Indications for Augmentation: Prolonged ROM   Labor complications: None       Additional complications:        Delivery Events:  Indications For Episiotomy:     Episiotomy: None   Perineal Laceration(s): None   Repaired:     Periurethral Laceration Location:      Repaired:     Labial Laceration Location:     Repaired:     Sulcal Laceration Location:     Repaired:     Vaginal Laceration Location:     Repaired:     Cervical Laceration Location:     Repaired:     Repair Suture: None   Number of Repair Packets: 0   Estimated Blood Loss (ml): 250ml     Delivery Date: 2019    Delivery Time: 8:07 PM  Delivery Type: Vaginal, Spontaneous  Sex:  Male    Gestational Age: 42w4d   Delivery Clinician: Brianna Butler  Living Status: Living   Delivery Location: L&D [facility]  One minute Five minutes Ten minutes   Skin color: 1   1        Heart rate: 2   2        Grimace: 2   2        Muscle tone: 2   2        Breathin   2        Totals: 9   9            Presentation: Vertex    Position: Left Occiput Anterior  Resuscitation Method:  Tactile Stimulation;Suctioning-bulb     Meconium Stained: None      Cord Information: 3 Vessels  Complications: None  Cord around:    Delayed cord clamping?  Yes  Cord clamped date/time:2019  8:08 PM  Disposition of Cord Blood: Lab    Blood Gases Sent?: Yes    Placenta:  Date/Time: 2019  8:18 PM  Removal: Spontaneous      Appearance: Normal;Intact      Measurements:  Birth Weight: 6 lb 8.6 oz (2.965 kg)      Birth Length: 1' 8.87\" (0.53 m)      Head Circumference: 1' 0.99\" (0.33 m)      Chest Circumference: 11.42\" (0.29 m)     Abdominal Girth: Other Providers:   Wanda EDGAR;BASIA DEALCRUZ;;ZACARIAS PIKE;, Obstetrician;Primary Nurse;Primary [place] Nurse;Charge Nurse;Scrub Tech           Group B Strep:   Lab Results   Component Value Date/Time    DudleyGULSHANluther External Neg 2019     Information for the patient's :  Ashutosh Pastranatte [[id number]]   No results found for: ABORH, PCTABR, PCTDIG, BILI, ABORHEXT, ABORH    No results for input(s): PCO2CB, PO2CB, HCO3I, SO2I, IBD, PTEMPI, SPECTI, PHICB, ISITE, IDEV, IALLEN in the last 72 hours.

## 2019-05-09 VITALS
SYSTOLIC BLOOD PRESSURE: 115 MMHG | BODY MASS INDEX: 33.75 KG/M2 | WEIGHT: 210 LBS | HEART RATE: 101 BPM | OXYGEN SATURATION: 99 % | RESPIRATION RATE: 18 BRPM | HEIGHT: 66 IN | DIASTOLIC BLOOD PRESSURE: 68 MMHG | TEMPERATURE: 98.3 F

## 2019-05-09 PROCEDURE — 74011250637 HC RX REV CODE- 250/637: Performed by: OBSTETRICS & GYNECOLOGY

## 2019-05-09 RX ORDER — OXYCODONE HYDROCHLORIDE 5 MG/1
5 TABLET ORAL
Qty: 10 TAB | Refills: 0 | Status: SHIPPED | OUTPATIENT
Start: 2019-05-09 | End: 2019-05-12

## 2019-05-09 RX ORDER — IBUPROFEN 800 MG/1
800 TABLET ORAL
Qty: 35 TAB | Refills: 0 | Status: SHIPPED | OUTPATIENT
Start: 2019-05-09 | End: 2019-05-24 | Stop reason: ALTCHOICE

## 2019-05-09 RX ADMIN — IBUPROFEN 800 MG: 800 TABLET ORAL at 08:18

## 2019-05-09 NOTE — LACTATION NOTE
This note was copied from a baby's chart. Individualized Feeding Plan for Breastfeeding Lactation Services [phone] As much as possible, hold your baby on your chest so babys bare skin is against your bare skin with a blanket covering babys back, especially 30 minutes before it is time for baby to eat. Watch for early feeding cues such as, licking lips, sucking motions, rooting, hands to mouth. Crying is a late feeding cue. Feed your baby at least 8 times in 24 hours, or more if your baby is showing feeding cues. If baby is sleepy put baby skin to skin and watch for hunger cues. To rouse baby: unwrap, undress, massage hands, feet, & back, change diaper, gently change babys position from lying to sitting. 15-20 minutes on the first breast of active breastfeeding is considered a good feeding. Good, active breastfeeding is when baby is alert, tugging the nipple, their ear may move, and you can hear swallows. Allow baby to finish the first side before changing sides. Sleeping at the breast or only brief, light sucks should not be considered a good, full breastfeed. At each feeding: 
__x__1. Do Suck Practice on finger before each feeding until sucking pattern is smooth. Try using index finger. Nail down towards tongue. __x__2. Hand Express for a few minutes prior to latching to help start milk flow. __x__3. Baby needs to NURSE WELL x 15-20 minutes on at least first breast, burp and offer 2nd breast at every feeding. If no sustained latch only attempt at breast for 10 minutes. If baby does NOT latch on and feed well on at least one side, you should:  
__x__4. Double pump for 15 minutes with breast massage and compression. Hand express for an additional 2-3 minutes per side. Pump after each feeding attempt or poor feeding, up to 8 times per day. If you are not putting baby to the breast you need to pump 8 times a day. Pump every at least every 3 hours. __x__5. Give baby all of the breast milk you obtain from pumping first and then supplement with formula to ensure baby has a full feeding. AVERAGE INTAKES OF COLOSTRUM BY HEALTHY  INFANTS: 
Time  Day Intake (ml/feed)  Based on 8 feedings per day. 1st 24 hrs  1 2-10 ml 
24-48 hrs  2 5-15 ml 
48-72 hrs  3 15-30 ml (0.5-1 oz) Amount is per feeding 72-96 hrs  4 30-45 ml (1-1.5oz) 5-6       45-60 ml (1.5-2oz) 7          60ml (2oz) By day 7, baby will need 60 ml or 2 oz at each feeding based on 8 feedings per day & babys weight. (1oz = 30ml). Total milk volume needed in 24 hours by Day 7 is 15.5-17.5 oz per day based on baby's birthweight of 6-9. The more often baby eats, the less volume they need per feeding. If baby is eating more often than the minimum of 8 times per day, they may take less per feeding. Comments: Use feeding plan until follow up with pediatrician. Continue to attempt at the breast for most feeds. Pump every 3 hours if no latch. Give all pumped colostrum/breastmilk at each feeding plus formula supplement to ensure full feeding.

## 2019-05-09 NOTE — PROGRESS NOTES
Post-Partum Day 2  Number  Progress Note Patient doing well  without significant complaints. Pain controlled on current medication. Voiding without difficulty, normal lochia. Vitals:   
Visit Vitals /68 (BP 1 Location: Left arm, BP Patient Position: At rest) Pulse (!) 101 Temp 98.3 °F (36.8 °C) Resp 18 Ht 5' 6\" (1.676 m) Wt 210 lb (95.3 kg) LMP 08/20/2018 SpO2 99% Breastfeeding? Unknown BMI 33.89 kg/m² Vital signs stable, afebrile. Exam:  Patient without distress. Heart rrr 
Lungs cta b&s Abdomen soft, fundus firm at level of umbilicus, nontender. Lower extremities are negative for swelling, cords or tenderness. Lab Results Component Value Date/Time ABO Group O 11/12/2018 10:17 AM  
 Rh (D) Positive 11/12/2018 10:17 AM  
 ABO/Rh(D) Shyanne Quinonezk POSITIVE 05/07/2019 08:42 AM  
 
  
Lab Results Component Value Date/Time ABO/Rh(D) O POSITIVE 05/07/2019 08:42 AM  
 Antibody screen NEG 05/07/2019 08:42 AM  
 Antibody screen, External negative 11/12/2018 Rubella, External immune 11/12/2018 GrBStrep, External Neg 03/29/2019 ABO,Rh O positive 11/12/2018 Lab Results Component Value Date/Time HGB 13.8 05/07/2019 08:42 AM  
 Hgb, External 12.6 03/06/2019 Assessment and Plan:  Patient appears to be having uncomplicated post-partum course. Continue routine post-delivery care and maternal education. Breastfeeding. Will discharge home.

## 2019-05-09 NOTE — PROGRESS NOTES
Shift assessment complete as noted. Patient denies needs. Questions encouraged and answered. Encouraged to call for needs or concerns. Verbalizes understanding. Pt resting. Encouraged to call RN as needed.

## 2019-05-09 NOTE — LACTATION NOTE
Mom and baby are going home today. Continue to offer the breast without restriction. Mom's milk should be fully in over the next few days. Reviewed engorgement precautions. Hand Expression has been demoed and written hand-out reviewed. As milk comes in baby will be more alert at the breast and swallows will be more obvious. Breasts may feel softer once baby has finished nursing. Baby should be back to birth weight by 3weeks of age. And then gain on average 1 oz per day for the next 2-3 months. Reviewed babies should be exclusively breastfeeding for the first 6 months and that breastfeeding should continue after introduction of appropriate complimentary foods after 6 months. Initial output should be at least 1 wet and 1 bowel movement for each day old baby is. By day 5-7 once milk is fully in baby will consistently have 6 or more soaking wet diapers and about 4 bowel movement. Some babies have a bowel movement with every feeding and some have 1-3 large bowel movements each day. Inadequate output may indicate inadequate feedings and should be reported to your Pediatrician. Bowel habits may change as baby gets older. Encouraged follow-up at Pediatrician in 1-2 days, no later than 1 week of age. Call [facility] for any questions as needed or to set up an OP visit. OP phone calls are returned within 24 hours. Community Breastfeeding Resource List given.

## 2019-05-09 NOTE — LACTATION NOTE
This note was copied from a baby's chart. Spoke with mother concerning infants feeding. Mother is still attempting to feed infant with no success in latch. Mother continues to pump after each feed with no colostrum obtained. Educated mother to continue to monitor infants output. Mother verbalizes that infant just had a void at 2100 and a stool at 2300. Infant has only lost 3 % of weight tonight. Educated mother that if infant becomes inconsolable, she has option to formula feed infant via curve tip syringe feed until milk comes in. Mother understands but wants to continue to attempt breastfeeding and pumping at this time.

## 2019-05-09 NOTE — LACTATION NOTE
Lactation visit. In to check on feeds. Mom reports continued latch attempts but has not successfully latched at all since birth. Mom pumping, still no colostrum, pumped 2 drops for the first time this morning. Baby over 39 hours old now, has had not measurable intake since birth. Discussed feeding needs. Mom to continue with latch attempts and pumping diligently but instructed mom that baby would need formula supplement at this time to ensure baby can feed adequately prior to discharge. Mom reluctant but agreeable. Baby just circumcised and fussy. Assisted mom with bottle feeding technique. Baby took 5ml well. Then became very fussy again. Offered bottle again but baby too fussy. Mom to soothe infant and then retry feeding again once baby more calm. Feeding plan given and reviewed, copy for home given. Discussed pumping every 3 hours. Mom has double pump for home use. No obvious risk factors for delayed lactogenesis II, will need close montoring and follow up of milk supply. Discussed outpatient visit next week if needed. Questions answered. Watch output. RN updated.

## 2019-05-09 NOTE — PROGRESS NOTES
Bedside report received from Titi Arias RN. Patient care assumed. Pt changing infant's diaper. Denies any needs. Spouse at bedside.

## 2019-05-09 NOTE — DISCHARGE INSTRUCTIONS
Discharge instruction to follow: Activity: Pelvis rest for 6 weeks     No heavy lifting over 15 lbs for 2 weeks     No driving for 2 weeks     No push/pull motion such as sweeping or vacuuming for 2 weeks     No tub baths for 6 weeks    Continue using the chloe-bottle after each void or bowel movement. If using sitz bath continue until comfortable stopping. Change sanitary pad after each urination or bowel movement. Call MD for the following:      Fever over 101 F; pain not relieved by medication; foul smelling vaginal discharge or an increase in vaginal bleeding. Take medication as prescribed. Follow up with MD as order. DISCHARGE SUMMARY from Nurse    PATIENT INSTRUCTIONS:    After general anesthesia or intravenous sedation, for 24 hours or while taking prescription Narcotics:  · Limit your activities  · Do not drive and operate hazardous machinery  · Do not make important personal or business decisions  · Do  not drink alcoholic beverages  · If you have not urinated within 8 hours after discharge, please contact your surgeon on call. Report the following to your surgeon:  · Excessive pain, swelling, redness or odor of or around the surgical area  · Temperature over 100.5  · Nausea and vomiting lasting longer than 4 hours or if unable to take medications  · Any signs of decreased circulation or nerve impairment to extremity: change in color, persistent  numbness, tingling, coldness or increase pain  · Any questions    What to do at Home:  Recommended activity: Activity as tolerated,     *  Please give a list of your current medications to your Primary Care Provider. *  Please update this list whenever your medications are discontinued, doses are      changed, or new medications (including over-the-counter products) are added. *  Please carry medication information at all times in case of emergency situations.     These are general instructions for a healthy lifestyle:    No smoking/ No tobacco products/ Avoid exposure to second hand smoke  Surgeon General's Warning:  Quitting smoking now greatly reduces serious risk to your health. Obesity, smoking, and sedentary lifestyle greatly increases your risk for illness    A healthy diet, regular physical exercise & weight monitoring are important for maintaining a healthy lifestyle    You may be retaining fluid if you have a history of heart failure or if you experience any of the following symptoms:  Weight gain of 3 pounds or more overnight or 5 pounds in a week, increased swelling in our hands or feet or shortness of breath while lying flat in bed. Please call your doctor as soon as you notice any of these symptoms; do not wait until your next office visit. Recognize signs and symptoms of STROKE:    F-face looks uneven    A-arms unable to move or move unevenly    S-speech slurred or non-existent    T-time-call 911 as soon as signs and symptoms begin-DO NOT go       Back to bed or wait to see if you get better-TIME IS BRAIN. Warning Signs of HEART ATTACK     Call 911 if you have these symptoms:   Chest discomfort. Most heart attacks involve discomfort in the center of the chest that lasts more than a few minutes, or that goes away and comes back. It can feel like uncomfortable pressure, squeezing, fullness, or pain.  Discomfort in other areas of the upper body. Symptoms can include pain or discomfort in one or both arms, the back, neck, jaw, or stomach.  Shortness of breath with or without chest discomfort.  Other signs may include breaking out in a cold sweat, nausea, or lightheadedness. Don't wait more than five minutes to call 911 - MINUTES MATTER! Fast action can save your life. Calling 911 is almost always the fastest way to get lifesaving treatment. Emergency Medical Services staff can begin treatment when they arrive -- up to an hour sooner than if someone gets to the hospital by car.      The discharge information has been reviewed with the patient. The patient verbalized understanding. Discharge medications reviewed with the patient and appropriate educational materials and side effects teaching were provided. ___________________________________________________________________________________________________________________________________  Patient Education        Vaginal Childbirth: Care Instructions  Your Care Instructions    Your body will slowly heal in the next few weeks. It is easy to get too tired and overwhelmed during the first weeks after your baby is born. Changes in your hormones can shift your mood without warning. You may find it hard to meet the extra demands on your energy and time. Take it easy on yourself. Follow-up care is a key part of your treatment and safety. Be sure to make and go to all appointments, and call your doctor if you are having problems. It's also a good idea to know your test results and keep a list of the medicines you take. How can you care for yourself at home? · Vaginal bleeding and cramps  ? After delivery, you will have a bloody discharge from the vagina. This will turn pink within a week and then white or yellow after about 10 days. It may last for 2 to 4 weeks or longer, until the uterus has healed. Use pads instead of tampons until you stop bleeding. ? Do not worry if you pass some blood clots, as long as they are smaller than a golf ball. If you have a tear or stitches in your vaginal area, change the pad at least every 4 hours to prevent soreness and infection. ? You may have cramps for the first few days after childbirth. These are normal and occur as the uterus shrinks to normal size. Take an over-the-counter pain medicine, such as acetaminophen (Tylenol), ibuprofen (Advil, Motrin), or naproxen (Aleve), for cramps. Read and follow all instructions on the label. Do not take aspirin, because it can cause more bleeding.   ? Do not take two or more pain medicines at the same time unless the doctor told you to. Many pain medicines have acetaminophen, which is Tylenol. Too much acetaminophen (Tylenol) can be harmful. · Stitches  ? If you have stitches, they will dissolve on their own and do not need to be removed. Follow your doctor's instructions for cleaning the stitched area. ? Put ice or a cold pack on your painful area for 10 to 20 minutes at a time, several times a day, for the first few days. Put a thin cloth between the ice and your skin. ? Sit in a few inches of warm water (sitz bath) 3 times a day and after bowel movements. The warm water helps with pain and itching. If you do not have a tub, a warm shower might help. · Breast fullness  ? Your breasts may overfill (engorge) in the first few days after delivery. To help milk flow and to relieve pain, warm your breasts in the shower or by using warm, moist towels before nursing. ? If you are not nursing, do not put warmth on your breasts or touch your breasts. Wear a tight bra or sports bra and use ice until the fullness goes away. This usually takes 2 to 3 days. ? Put ice or a cold pack on your breast after nursing to reduce swelling and pain. Put a thin cloth between the ice and your skin. · Activity  ? Eat a balanced diet. Do not try to lose weight by cutting calories. Keep taking your prenatal vitamins, or take a multivitamin. ? Get as much rest as you can. Try to take naps when your baby sleeps during the day. ? Get some exercise every day. But do not do any heavy exercise until your doctor says it is okay. ? Wait until you are healed (about 4 to 6 weeks) before you have sexual intercourse. Your doctor will tell you when it is okay to have sex. ? Talk to your doctor about birth control. You can get pregnant even before your period returns. Also, you can get pregnant while you are breastfeeding. · Mental health  ? It is normal to have some sadness, anxiety, sleeplessness, and mood swings after you go home.  If you feel upset or hopeless for more than a few days or are having trouble doing the things you need to do, talk to your doctor. · Constipation and hemorrhoids  ? Drink plenty of fluids, enough so that your urine is light yellow or clear like water. If you have kidney, heart, or liver disease and have to limit fluids, talk with your doctor before you increase the amount of fluids you drink. ? Eat plenty of fiber each day. Have a bran muffin or bran cereal for breakfast, and try eating a piece of fruit for a mid-afternoon snack. ? For painful, itchy hemorrhoids, put ice or a cold pack on the area several times a day for 10 minutes at a time. Follow this by putting a warm compress on the area for another 10 to 20 minutes or by sitting in a shallow, warm bath. When should you call for help? Call 911 anytime you think you may need emergency care. For example, call if:    · You passed out (lost consciousness).    Call your doctor now or seek immediate medical care if:    · You have severe vaginal bleeding.     · You are dizzy or lightheaded, or you feel like you may faint.     · You have a fever.     · You have new or more pain in your belly or pelvis.    Watch closely for changes in your health, and be sure to contact your doctor if:    · Your vaginal bleeding seems to be getting heavier.     · You have new or worse vaginal discharge.     · You feel sad, anxious, or hopeless for more than a few days.     · You do not get better as expected. Where can you learn more? Go to http://naeem-dotty.info/. Enter G261 in the search box to learn more about \"Vaginal Childbirth: Care Instructions. \"  Current as of: September 5, 2018  Content Version: 11.9  © 1824-6887 [employer]. Care instructions adapted under license by [employer] (which disclaims liability or warranty for this information).  If you have questions about a medical condition or this instruction, always ask your healthcare professional. [address] any warranty or liability for your use of this information.

## 2019-05-09 NOTE — DISCHARGE SUMMARY
Obstetrical Discharge Summary     Name: Jairo Ortiz MRN: [id number]  SSN: [id number]    YOB: 1995  Age: 22 y.o. Sex: female      Allergies: Patient has no known allergies. Admit Date: 2019    Discharge Date: 2019     Admitting Physician: Maliha Melton MD     Attending Physician:  Hetal De Jesus MD     * Admission Diagnoses: Vaginal discharge during pregnancy in third trimester [O26.893, N89.8];  premature rupture of membranes (PPROM) with onset of labor within 24 hours of rupture in third trimester, antepartum [O42.013]    * Discharge Diagnoses:   Information for the patient's :  Triston Olmstead [[id number]]   Delivery of a 6 lb 8.6 oz (2.965 kg) male infant via Vaginal, Spontaneous on 2019 at 8:07 PM  by . Apgars were 9 and 9. Additional Diagnoses:   Hospital Problems as of 2019 Date Reviewed: 2019          Codes Class Noted - Resolved POA    Vaginal discharge during pregnancy in third trimester ICD-10-CM: O26.893, N89.8  ICD-9-CM: 646.83, 623.5  2019 - Present Unknown        * (Principal) PROM (premature rupture of membranes) ICD-10-CM: O42.90  ICD-9-CM: 658.10  2019 - Present              Lab Results   Component Value Date/Time    ABO/Rh(D) O POSITIVE 2019 08:42 AM    Rubella, External immune 2018    GrBStrep, External Neg 2019    ABO,Rh O positive 2018      There is no immunization history on file for this patient.     * Procedures:  Vaginal delivery     [place]  Depression Scale  I have been able to laugh and see the funny side of things: As much as I always could  I have looked forward with enjoyment to things: As much as I ever did  I have blamed myself unnecessarily when things went wrong: Not very often  I have been anxious or worried for no good reason: Hardly ever  I have felt scared or panicky for no very good reason: No, not at all  Things have been getting on top of me: No, I have been coping as well as ever  I have been so unhappy that I have had difficulty sleeping: No, not at all  I have felt sad or miserable: No, not at all  I have been so unhappy that I have been crying: No, never  The thought of harming myself has occurred to me: Never  Total Score: 2    * Discharge Condition: stable    * Hospital Course: Normal hospital course following the delivery. * Disposition: Home    Discharge Medications:   Current Discharge Medication List      START taking these medications    Details   ibuprofen (MOTRIN) 800 mg tablet Take 1 Tab by mouth every eight (8) hours as needed for Pain. Qty: 35 Tab, Refills: 0      oxyCODONE IR (ROXICODONE) 5 mg immediate release tablet Take 1 Tab by mouth every four (4) hours as needed for Pain for up to 3 days. Max Daily Amount: 30 mg.  Qty: 10 Tab, Refills: 0    Associated Diagnoses: Premature rupture of membranes with onset of labor within 24 hours of rupture, unspecified gestational age         [de-identified] these medications which have NOT CHANGED    Details   PNV Comb. No76-Iron,Carbonyl-FA (PNV 29-1) 29 mg iron- 1 mg tab Take 1 Tab by mouth daily. Qty: 30 Tab, Refills: 11             * Follow-up Care/Patient Instructions:   Activity: No sex for 6 weeks  Diet: Regular Diet  Wound Care: As directed    Follow-up Information     Follow up With Specialties Details Why Contact Info    Unknown, Provider    Patient not available to ask

## 2019-06-17 PROBLEM — O42.90 PROM (PREMATURE RUPTURE OF MEMBRANES): Status: RESOLVED | Noted: 2019-05-07 | Resolved: 2019-06-17

## 2019-06-17 PROBLEM — N89.8 VAGINAL DISCHARGE DURING PREGNANCY IN THIRD TRIMESTER: Status: RESOLVED | Noted: 2019-05-07 | Resolved: 2019-06-17

## 2019-06-17 PROBLEM — O26.893 VAGINAL DISCHARGE DURING PREGNANCY IN THIRD TRIMESTER: Status: RESOLVED | Noted: 2019-05-07 | Resolved: 2019-06-17

## 2019-07-11 ENCOUNTER — TELEPHONE (OUTPATIENT)
Dept: CASE MANAGEMENT | Age: 24
End: 2019-07-11

## 2019-07-11 NOTE — TELEPHONE ENCOUNTER
Phone call to patient at [phone] at the request of OB due to postpartum depression. Introduction made by .  explained purpose of phone call; patient agreeable to continue conversation. Patient states that the first 3-4 weeks postpartum \"were great,\" but she noticed a decline in her mood approximately 2 weeks ago. Patient denies any depression or anger issues, but is experiencing a significant amount of anxiety. Patient was prescribed Zoloft by OB and plans to start taking this medication tomorrow. Patient has taken an antidepressant in the past when her grandfather passed away. She required this medication for 6 months and also sought the support of a counselor (M[facility] in [facility]). Discussed current stressors: patient's grandmother  in January and she has not effectively grieved, patient is currently taking online classes, patient works on the weekends, relationship strain with boyfriend, and patient is preparing to move soon. Patient states that she has a very strong support system consisting of both her family and FOB's family. Explored how patient is currently practicing self-care. Patient states that she goes to the gym and \"punches the punching bag\" which she has found helpful. Patient was encouraged to continue nurturing her relationship with FOB as having a baby can stress the relationship. Patient states that she and FOB may be going to [place] in the next few weeks. Patient states that she's open to seeing a counselor again.  will e-mail patient local resources for mental health supports in her area (including information on practice specializing in  mood disorders that accepts her insurance: [facility]). Information on support available thru [employer] will also be included. Patient denied any additional needs from  at this time.   She states that she feels \"more motivated today.\"  Patient provided 's contact information.       GARLAND Sunshine  [facility]   [phone]

## 2019-07-18 ENCOUNTER — TELEPHONE (OUTPATIENT)
Dept: CASE MANAGEMENT | Age: 24
End: 2019-07-18

## 2019-07-18 NOTE — TELEPHONE ENCOUNTER
Phone call to patient at [phone]. Patient states that she continues to take her Zoloft daily and is feeling emotionally better. Per patient, \"I want to find a counselor for me and my boyfriend so we can have better communication. \"  Patient has previously been e-mailed information of local counselors/resources. Patient states that she's preparing to move in with her mother for the next 2 months, and then she and FOB will be moving into a house in [facility]. Patient denied any needs from  at this time. Patient has this 's contact information should any needs/questions arise.     GARLAND Carter  [place]   [phone]

## 2019-08-14 ENCOUNTER — TELEPHONE (OUTPATIENT)
Dept: CASE MANAGEMENT | Age: 24
End: 2019-08-14

## 2019-10-17 PROBLEM — O99.343 DEPRESSION AFFECTING PREGNANCY IN THIRD TRIMESTER, ANTEPARTUM: Status: RESOLVED | Noted: 2018-11-12 | Resolved: 2019-10-17

## 2019-10-17 PROBLEM — O60.03 PRETERM LABOR IN THIRD TRIMESTER WITHOUT DELIVERY: Status: RESOLVED | Noted: 2019-03-29 | Resolved: 2019-10-17

## 2019-10-17 PROBLEM — Z34.90 PREGNANCY: Status: RESOLVED | Noted: 2018-11-12 | Resolved: 2019-10-17

## 2019-10-17 PROBLEM — M41.9 SCOLIOSIS: Status: RESOLVED | Noted: 2018-11-12 | Resolved: 2019-10-17

## 2019-10-17 PROBLEM — O36.5930 POOR FETAL GROWTH AFFECTING MANAGEMENT OF MOTHER IN THIRD TRIMESTER: Status: RESOLVED | Noted: 2019-04-17 | Resolved: 2019-10-17

## 2019-10-17 PROBLEM — F32.A DEPRESSION AFFECTING PREGNANCY IN THIRD TRIMESTER, ANTEPARTUM: Status: RESOLVED | Noted: 2018-11-12 | Resolved: 2019-10-17

## 2021-11-17 ENCOUNTER — APPOINTMENT (OUTPATIENT)
Dept: GENERAL RADIOLOGY | Age: 26
End: 2021-11-17
Attending: EMERGENCY MEDICINE
Payer: COMMERCIAL

## 2021-11-17 ENCOUNTER — HOSPITAL ENCOUNTER (EMERGENCY)
Age: 26
Discharge: HOME | End: 2021-11-17
Attending: EMERGENCY MEDICINE
Payer: COMMERCIAL

## 2021-11-17 VITALS
RESPIRATION RATE: 22 BRPM | SYSTOLIC BLOOD PRESSURE: 147 MMHG | HEART RATE: 93 BPM | OXYGEN SATURATION: 96 % | BODY MASS INDEX: 26.37 KG/M2 | TEMPERATURE: 99.6 F | DIASTOLIC BLOOD PRESSURE: 100 MMHG | HEIGHT: 67 IN | WEIGHT: 168 LBS

## 2021-11-17 DIAGNOSIS — J20.9 ACUTE BRONCHITIS, UNSPECIFIED ORGANISM: Primary | ICD-10-CM

## 2021-11-17 PROCEDURE — 99282 EMERGENCY DEPT VISIT SF MDM: CPT

## 2021-11-17 PROCEDURE — 71046 X-RAY EXAM CHEST 2 VIEWS: CPT

## 2021-11-17 RX ORDER — ALBUTEROL SULFATE 90 UG/1
2 AEROSOL, METERED RESPIRATORY (INHALATION)
Qty: 1 EACH | Refills: 0 | Status: SHIPPED | OUTPATIENT
Start: 2021-11-17 | End: 2021-12-16

## 2021-11-17 RX ORDER — DOXYCYCLINE HYCLATE 100 MG
100 TABLET ORAL 2 TIMES DAILY
Qty: 14 TABLET | Refills: 0 | Status: SHIPPED | OUTPATIENT
Start: 2021-11-17 | End: 2021-11-24

## 2021-11-17 NOTE — ED TRIAGE NOTES
Pt arrives via pov c/o chest congestion and cough. Tested recently for covid here. Advil congestion not helping. Presents with even and unlabored respirations. No medical hx.

## 2021-11-18 NOTE — ED NOTES
I have reviewed discharge instructions with the patient. The patient verbalized understanding. Patient left ED via Discharge Method: ambulatory to Home with self  Opportunity for questions and clarification provided. Patient given 2 scripts. To continue your aftercare when you leave the hospital, you may receive an automated call from our care team to check in on how you are doing. This is a free service and part of our promise to provide the best care and service to meet your aftercare needs.  If you have questions, or wish to unsubscribe from this service please call [phone]. Thank you for Choosing our [address] [place] [address] Emergency Department.

## 2021-11-18 NOTE — ED PROVIDER NOTES
Patient is a smoker. Presents with 2-1/2 weeks of congestion and cough. Has been seen twice with no acute on chest x-ray. Took a Z-Fredi. Prescribed steroids but did not take. Symptoms have not improved and presents today with with continued. Has had 2 - Covid swabs. The history is provided by the patient. No  was used. Cough  This is a new problem. The current episode started more than 1 week ago. The problem occurs constantly. The problem has not changed since onset. The cough is non-productive. Patient reports a subjective fever - was not measured. Associated symptoms include chest pain (with cough). Pertinent negatives include no chills, no sweats, no ear pain, no headaches, no rhinorrhea, no sore throat, no myalgias, no shortness of breath, no wheezing, no nausea and no vomiting. She has tried nothing for the symptoms. She is a smoker. Past Medical History:   Diagnosis Date    Anemia     Anxiety     Chlamydia     Depression     medication 08/2017-Decemeber 2017. doing well off meds    Headache     Scoliosis        No past surgical history on file.       Family History:   Problem Relation Age of Onset    Hypertension Mother     Cancer Maternal Grandfather         bone    Breast Cancer Paternal Grandmother     Other Cousin         polydactyl       Social History     Socioeconomic History    Marital status: SINGLE     Spouse name: Not on file    Number of children: Not on file    Years of education: Not on file    Highest education level: Not on file   Occupational History    Not on file   Tobacco Use    Smoking status: Former Smoker     Packs/day: 0.25     Years: 4.00     Pack years: 1.00    Smokeless tobacco: Never Used    Tobacco comment: none since +pt   Substance and Sexual Activity    Alcohol use: No     Comment: occ beer once a month/ none since +pt    Drug use: Yes     Types: Marijuana     Comment: used marijuana 1 year ago-08/2017    Sexual activity: Yes     Partners: Male     Birth control/protection: None   Other Topics Concern     Service Not Asked    Blood Transfusions Not Asked    Caffeine Concern Not Asked    Occupational Exposure Not Asked    Hobby Hazards Not Asked    Sleep Concern Not Asked    Stress Concern Not Asked    Weight Concern Not Asked    Special Diet Not Asked    Back Care Not Asked    Exercise Not Asked    Bike Helmet Not Asked   [address] Not Asked    Self-Exams Not Asked   Social History Narrative    Not on file     Social Determinants of Health     Financial Resource Strain:     Difficulty of Paying Living Expenses: Not on file   Food Insecurity:     Worried About Running Out of Food in the Last Year: Not on file    Laura of Food in the Last Year: Not on file   Transportation Needs:     Lack of Transportation (Medical): Not on file    Lack of Transportation (Non-Medical): Not on file   Physical Activity:     Days of Exercise per Week: Not on file    Minutes of Exercise per Session: Not on file   Stress:     Feeling of Stress : Not on file   Social Connections:     Frequency of Communication with Friends and Family: Not on file    Frequency of Social Gatherings with Friends and Family: Not on file    Attends Yazidism Services: Not on file    Active Member of [address] [place] [address] [email or web address] or Organizations: Not on file    Attends Club or Organization Meetings: Not on file    Marital Status: Not on file   Intimate Partner Violence:     Fear of Current or Ex-Partner: Not on file    Emotionally Abused: Not on file    Physically Abused: Not on file    Sexually Abused: Not on file   Housing Stability:     Unable to Pay for Housing in the Last Year: Not on file    Number of [place] in the Last Year: Not on file    Unstable Housing in the Last Year: Not on file         ALLERGIES: Patient has no known allergies. Review of Systems   Constitutional: Negative for chills and fever.    HENT: Negative for ear pain, rhinorrhea and sore throat. Respiratory: Positive for cough. Negative for chest tightness, shortness of breath and wheezing. Cardiovascular: Positive for chest pain (with cough). Negative for leg swelling. Gastrointestinal: Negative for abdominal pain, diarrhea, nausea and vomiting. Musculoskeletal: Negative for back pain, gait problem, myalgias, neck pain and neck stiffness. Skin: Negative for color change and rash. Neurological: Negative for weakness, numbness and headaches. Vitals:    11/17/21 1822   BP: (!) 147/100   Pulse: 93   Resp: 22   Temp: 99.6 °F (37.6 °C)   SpO2: 96%   Weight: 76.2 kg (168 lb)   Height: 5' 7\" (1.702 m)            Physical Exam  Constitutional:       Appearance: Normal appearance. She is well-developed. HENT:      Head: Normocephalic and atraumatic. Cardiovascular:      Rate and Rhythm: Normal rate and regular rhythm. Pulmonary:      Effort: Pulmonary effort is normal. No respiratory distress. Breath sounds: Normal breath sounds. Abdominal:      General: Bowel sounds are normal.      Palpations: Abdomen is soft. Tenderness: There is no abdominal tenderness. Musculoskeletal:         General: Normal range of motion. Skin:     General: Skin is warm and dry. Neurological:      Mental Status: She is alert and oriented to person, place, and time. MDM  Number of Diagnoses or Management Options  Diagnosis management comments: Patient with bronchitis. Will treat with inhaler and antibiotics. Will take her steroid at home.        Amount and/or Complexity of Data Reviewed  Tests in the radiology section of CPT®: reviewed and ordered    Patient Progress  Patient progress: stable         Procedures      XR CHEST PA LAT (Final result)  Result time 11/17/21 18:40:58  Final result by Sheryle Innocent, MD (11/17/21 18:40:58)                Impression:    No acute findings in the chest             Narrative:    Chest X-ray     INDICATION: Cough and congestion     PA and lateral views of the chest were obtained. FINDINGS: The lungs are clear.  There are no infiltrates or effusions.  The heart   size is normal.  The bony thorax is intact.

## 2021-12-16 PROBLEM — D69.6 THROMBOCYTOPENIA (HCC): Status: ACTIVE | Noted: 2021-08-13

## 2022-01-02 ENCOUNTER — APPOINTMENT (OUTPATIENT)
Dept: ULTRASOUND IMAGING | Age: 27
End: 2022-01-02
Attending: EMERGENCY MEDICINE
Payer: COMMERCIAL

## 2022-01-02 ENCOUNTER — HOSPITAL ENCOUNTER (EMERGENCY)
Age: 27
Discharge: HOME | End: 2022-01-02
Attending: EMERGENCY MEDICINE
Payer: COMMERCIAL

## 2022-01-02 VITALS
SYSTOLIC BLOOD PRESSURE: 122 MMHG | HEIGHT: 67 IN | RESPIRATION RATE: 16 BRPM | OXYGEN SATURATION: 97 % | HEART RATE: 85 BPM | BODY MASS INDEX: 25.43 KG/M2 | TEMPERATURE: 99.1 F | WEIGHT: 162 LBS | DIASTOLIC BLOOD PRESSURE: 74 MMHG

## 2022-01-02 DIAGNOSIS — O41.8X10 SUBCHORIONIC HEMORRHAGE OF PLACENTA IN FIRST TRIMESTER, SINGLE OR UNSPECIFIED FETUS: Primary | ICD-10-CM

## 2022-01-02 DIAGNOSIS — O46.8X1 SUBCHORIONIC HEMORRHAGE OF PLACENTA IN FIRST TRIMESTER, SINGLE OR UNSPECIFIED FETUS: Primary | ICD-10-CM

## 2022-01-02 LAB
ABO + RH BLD: NORMAL
ANION GAP SERPL CALC-SCNC: 7 MMOL/L (ref 7–16)
BACTERIA URNS QL MICRO: 0 /HPF
BASOPHILS # BLD: 0 K/UL (ref 0–0.2)
BASOPHILS NFR BLD: 0 % (ref 0–2)
BUN SERPL-MCNC: 14 MG/DL (ref 6–23)
CALCIUM SERPL-MCNC: 9.2 MG/DL (ref 8.3–10.4)
CASTS URNS QL MICRO: ABNORMAL /LPF
CHLORIDE SERPL-SCNC: 106 MMOL/L (ref 98–107)
CO2 SERPL-SCNC: 24 MMOL/L (ref 21–32)
CREAT SERPL-MCNC: 0.69 MG/DL (ref 0.6–1)
DIFFERENTIAL METHOD BLD: NORMAL
EOSINOPHIL # BLD: 0.1 K/UL (ref 0–0.8)
EOSINOPHIL NFR BLD: 1 % (ref 0.5–7.8)
EPI CELLS #/AREA URNS HPF: 0 /HPF
ERYTHROCYTE [DISTWIDTH] IN BLOOD BY AUTOMATED COUNT: 12 % (ref 11.9–14.6)
GLUCOSE SERPL-MCNC: 74 MG/DL (ref 65–100)
HCG SERPL-ACNC: ABNORMAL MIU/ML (ref 0–6)
HCG UR QL: POSITIVE
HCT VFR BLD AUTO: 37.3 % (ref 35.8–46.3)
HGB BLD-MCNC: 12.9 G/DL (ref 11.7–15.4)
IMM GRANULOCYTES # BLD AUTO: 0 K/UL (ref 0–0.5)
IMM GRANULOCYTES NFR BLD AUTO: 0 % (ref 0–5)
LYMPHOCYTES # BLD: 2.2 K/UL (ref 0.5–4.6)
LYMPHOCYTES NFR BLD: 25 % (ref 13–44)
MCH RBC QN AUTO: 31.8 PG (ref 26.1–32.9)
MCHC RBC AUTO-ENTMCNC: 34.6 G/DL (ref 31.4–35)
MCV RBC AUTO: 91.9 FL (ref 79.6–97.8)
MONOCYTES # BLD: 1 K/UL (ref 0.1–1.3)
MONOCYTES NFR BLD: 11 % (ref 4–12)
NEUTS SEG # BLD: 5.7 K/UL (ref 1.7–8.2)
NEUTS SEG NFR BLD: 63 % (ref 43–78)
NRBC # BLD: 0 K/UL (ref 0–0.2)
PLATELET # BLD AUTO: 245 K/UL (ref 150–450)
PMV BLD AUTO: 10.7 FL (ref 9.4–12.3)
POTASSIUM SERPL-SCNC: 4.9 MMOL/L (ref 3.5–5.1)
RBC # BLD AUTO: 4.06 M/UL (ref 4.05–5.2)
RBC #/AREA URNS HPF: >100 /HPF
SODIUM SERPL-SCNC: 137 MMOL/L (ref 136–145)
WBC # BLD AUTO: 9.1 K/UL (ref 4.3–11.1)
WBC URNS QL MICRO: ABNORMAL /HPF

## 2022-01-02 PROCEDURE — 99284 EMERGENCY DEPT VISIT MOD MDM: CPT

## 2022-01-02 PROCEDURE — 80048 BASIC METABOLIC PNL TOTAL CA: CPT

## 2022-01-02 PROCEDURE — 81015 MICROSCOPIC EXAM OF URINE: CPT

## 2022-01-02 PROCEDURE — 81003 URINALYSIS AUTO W/O SCOPE: CPT

## 2022-01-02 PROCEDURE — 81025 URINE PREGNANCY TEST: CPT

## 2022-01-02 PROCEDURE — 86900 BLOOD TYPING SEROLOGIC ABO: CPT

## 2022-01-02 PROCEDURE — 85025 COMPLETE CBC W/AUTO DIFF WBC: CPT

## 2022-01-02 PROCEDURE — 76815 OB US LIMITED FETUS(S): CPT

## 2022-01-02 PROCEDURE — 84702 CHORIONIC GONADOTROPIN TEST: CPT

## 2022-01-02 RX ORDER — SODIUM CHLORIDE 0.9 % (FLUSH) 0.9 %
5-10 SYRINGE (ML) INJECTION AS NEEDED
Status: DISCONTINUED | OUTPATIENT
Start: 2022-01-02 | End: 2022-01-02 | Stop reason: HOSPADM

## 2022-01-02 RX ORDER — SODIUM CHLORIDE 0.9 % (FLUSH) 0.9 %
5-10 SYRINGE (ML) INJECTION EVERY 8 HOURS
Status: DISCONTINUED | OUTPATIENT
Start: 2022-01-02 | End: 2022-01-02 | Stop reason: HOSPADM

## 2022-01-02 NOTE — ED PROVIDER NOTES
Patient is here with pelvic pain, cramping and vaginal bleeding. The pelvic pain and cramping has been going on for a few days but the bleeding started today. She states she is 8 weeks pregnant. Her last menstrual cycle started November 7. This is her second pregnancy. She is a patient at [facility]. She states that she has been to the [place] physician and also had a pelvic ultrasound that documented an IUP. She has blood type O+. No chest pain, shortness of breath, normal pain, dizziness, weakness, dyspnea exertion, orthopnea, swelling/tingling or weakness to her arms or legs, trouble with urination or bowel movements or other new symptoms. She did ambulate to the room without difficulty and is well-hydrated. The history is provided by the patient. Pregnancy Problem  This is a new problem. The current episode started more than 2 days ago. The problem occurs constantly. The problem has been gradually worsening. Pertinent negatives include no chest pain, no abdominal pain, no headaches and no shortness of breath. Nothing aggravates the symptoms. Nothing relieves the symptoms. She has tried nothing for the symptoms. Past Medical History:   Diagnosis Date    Anemia     Anxiety     Chlamydia     Depression     medication 08/2017-Decemeber 2017. doing well off meds    Headache     Scoliosis        No past surgical history on file.       Family History:   Problem Relation Age of Onset    Hypertension Mother     Cancer Maternal Grandfather         bone    Breast Cancer Paternal Grandmother     Other Cousin         polydactyl       Social History     Socioeconomic History    Marital status: SINGLE     Spouse name: Not on file    Number of children: Not on file    Years of education: Not on file    Highest education level: Not on file   Occupational History    Not on file   Tobacco Use    Smoking status: Former Smoker     Packs/day: 0.25     Years: 4.00     Pack years: 1.00    Smokeless tobacco: Never Used    Tobacco comment: none since +pt   Substance and Sexual Activity    Alcohol use: No     Comment: occ beer once a month/ none since +pt    Drug use: Yes     Types: Marijuana     Comment: used marijuana 1 year ago-08/2017    Sexual activity: Yes     Partners: Male     Birth control/protection: None   Other Topics Concern     Service Not Asked    Blood Transfusions Not Asked    Caffeine Concern Not Asked    Occupational Exposure Not Asked    Hobby Hazards Not Asked    Sleep Concern Not Asked    Stress Concern Not Asked    Weight Concern Not Asked    Special Diet Not Asked    Back Care Not Asked    Exercise Not Asked    Bike Helmet Not Asked    Seat Belt Not Asked    Self-Exams Not Asked   Social History Narrative    Not on file     Social Determinants of Health     Financial Resource Strain:     Difficulty of Paying Living Expenses: Not on file   Food Insecurity:     Worried About Running Out of Food in the Last Year: Not on file    Laura of Food in the Last Year: Not on file   Transportation Needs:     Lack of Transportation (Medical): Not on file    Lack of Transportation (Non-Medical):  Not on file   Physical Activity:     Days of Exercise per Week: Not on file    Minutes of Exercise per Session: Not on file   Stress:     Feeling of Stress : Not on file   Social Connections:     Frequency of Communication with Friends and Family: Not on file    Frequency of Social Gatherings with Friends and Family: Not on file    Attends Judaism Services: Not on file    Active Member of Clubs or Organizations: Not on file    Attends Club or Organization Meetings: Not on file    Marital Status: Not on file   Intimate Partner Violence:     Fear of Current or Ex-Partner: Not on file    Emotionally Abused: Not on file    Physically Abused: Not on file    Sexually Abused: Not on file   Housing Stability:     Unable to Pay for Housing in the Last Year: Not on file    Number of Places Lived in the Last Year: Not on file    Unstable Housing in the Last Year: Not on file         ALLERGIES: Patient has no known allergies. Review of Systems   Constitutional: Negative. HENT: Negative. Eyes: Negative. Respiratory: Negative. Negative for shortness of breath. Cardiovascular: Negative. Negative for chest pain. Gastrointestinal: Negative. Negative for abdominal pain. Genitourinary: Positive for pelvic pain and vaginal bleeding. Musculoskeletal: Negative. Skin: Negative. Neurological: Negative. Negative for headaches. Psychiatric/Behavioral: Negative. All other systems reviewed and are negative. Vitals:    01/02/22 1606   BP: 122/74   Pulse: 85   Resp: 16   Temp: 99.1 °F (37.3 °C)   SpO2: 100%   Weight: 73.5 kg (162 lb)   Height: 5' 7\" (1.702 m)            Physical Exam  Vitals and nursing note reviewed. Exam conducted with a chaperone present Shilpa Lowery RN). Constitutional:       Appearance: Normal appearance. She is well-developed. HENT:      Head: Normocephalic and atraumatic. Right Ear: External ear normal.      Left Ear: External ear normal.      Nose: Nose normal.      Mouth/Throat:      Mouth: Mucous membranes are moist.   Eyes:      Conjunctiva/sclera: Conjunctivae normal.      Pupils: Pupils are equal, round, and reactive to light. Cardiovascular:      Rate and Rhythm: Normal rate. Pulses: Normal pulses. Pulmonary:      Effort: Pulmonary effort is normal.   Abdominal:      General: Abdomen is flat. Musculoskeletal:         General: Normal range of motion. Cervical back: Normal range of motion and neck supple. Skin:     General: Skin is warm and dry. Capillary Refill: Capillary refill takes less than 2 seconds. Neurological:      General: No focal deficit present. Mental Status: She is alert and oriented to person, place, and time. Mental status is at baseline.       Deep Tendon Reflexes: Reflexes are normal and symmetric. Psychiatric:         Mood and Affect: Mood normal.         Behavior: Behavior normal.         Thought Content: Thought content normal.         Judgment: Judgment normal.          MDM  Number of Diagnoses or Management Options     Amount and/or Complexity of Data Reviewed  Clinical lab tests: reviewed  Tests in the radiology section of CPT®: ordered and reviewed  Discuss the patient with other providers: yes (Dr. Colby Tripathi, [facility])    Risk of Complications, Morbidity, and/or Mortality  Presenting problems: moderate  Diagnostic procedures: moderate  Management options: moderate    Patient Progress  Patient progress: stable         Procedures    The patient was observed in the ED. Results Reviewed:      Recent Results (from the past 24 hour(s))   CBC WITH AUTOMATED DIFF    Collection Time: 01/02/22  4:18 PM   Result Value Ref Range    WBC 9.1 4.3 - 11.1 K/uL    RBC 4.06 4.05 - 5.2 M/uL    HGB 12.9 11.7 - 15.4 g/dL    HCT 37.3 35.8 - 46.3 %    MCV 91.9 79.6 - 97.8 FL    MCH 31.8 26.1 - 32.9 PG    MCHC 34.6 31.4 - 35.0 g/dL    RDW 12.0 11.9 - 14.6 %    PLATELET 479 310 - 850 K/uL    MPV 10.7 9.4 - 12.3 FL    ABSOLUTE NRBC 0.00 0.0 - 0.2 K/uL    DF AUTOMATED      NEUTROPHILS 63 43 - 78 %    LYMPHOCYTES 25 13 - 44 %    MONOCYTES 11 4.0 - 12.0 %    EOSINOPHILS 1 0.5 - 7.8 %    BASOPHILS 0 0.0 - 2.0 %    IMMATURE GRANULOCYTES 0 0.0 - 5.0 %    ABS. NEUTROPHILS 5.7 1.7 - 8.2 K/UL    ABS. LYMPHOCYTES 2.2 0.5 - 4.6 K/UL    ABS. MONOCYTES 1.0 0.1 - 1.3 K/UL    ABS. EOSINOPHILS 0.1 0.0 - 0.8 K/UL    ABS. BASOPHILS 0.0 0.0 - 0.2 K/UL    ABS. IMM.  GRANS. 0.0 0.0 - 0.5 K/UL   METABOLIC PANEL, BASIC    Collection Time: 01/02/22  4:18 PM   Result Value Ref Range    Sodium 137 136 - 145 mmol/L    Potassium 4.9 3.5 - 5.1 mmol/L    Chloride 106 98 - 107 mmol/L    CO2 24 21 - 32 mmol/L    Anion gap 7 7 - 16 mmol/L    Glucose 74 65 - 100 mg/dL    BUN 14 6 - 23 MG/DL    Creatinine 0.69 0.6 - 1.0 MG/DL    GFR est AA >60 >60 ml/min/1.73m2    GFR est non-AA >60 >60 ml/min/1.73m2    Calcium 9.2 8.3 - 10.4 MG/DL   BETA HCG, QT    Collection Time: 01/02/22  4:18 PM   Result Value Ref Range    Beta HCG, QT 51,474 (H) 0.0 - 6.0 MIU/ML   BLOOD TYPE, (ABO+RH)    Collection Time: 01/02/22  4:18 PM   Result Value Ref Range    ABO/Rh(D) O POSITIVE    URINE MICROSCOPIC    Collection Time: 01/02/22  8:00 PM   Result Value Ref Range    WBC 0-3 0 /hpf    RBC >100 (H) 0 /hpf    Epithelial cells 0 0 /hpf    Bacteria 0 0 /hpf    Casts 0-3 0 /lpf   HCG URINE, QL. - POC    Collection Time: 01/02/22  8:00 PM   Result Value Ref Range    Pregnancy test,urine (POC) Positive (A) NEG       US PREG UTS LTD   Final Result      1. Single living intrauterine pregnancy, age estimates and heart tones as above. 2. Probable small subchorionic hemorrhage. OB follow-up as clinically appropriate. 8:10 PM we were waiting on GC probes from the lab. We called 3 separate times and they did not send them. 8:22 PM Spoke with Dr. Gilda Ireland regarding patient. They will get her in this week. He states no pelvic exam is needed, they can do that in their office. Patient was informed of the ultrasound results and the need for pelvic rest, no intercourse, plenty of fluids, no heavy lifting and a note for work was provided. She expressed understanding. She is blood type O+, no RhoGam indicated. She was instructed to return to the ED if worsening in any way. I answered all of her questions and made sure she had no more. She is stable for discharge and ambulatory out of the ED without difficulty at this time. I discussed the results of all labs, procedures, radiographs, and treatments with the patient and available family. Treatment plan is agreed upon and the patient is ready for discharge. All voiced understanding of the discharge plan and medication instructions or changes as appropriate.   Questions about treatment in the ED were answered. All were encouraged to return should symptoms worsen or new problems develop.

## 2022-01-02 NOTE — Clinical Note
[address] [facility] EMERGENCY DEPT   [facility] [id number] [phone]    Work/School Note    Date: 1/2/2022    To Whom It May concern:    Spencer Muro was seen and treated today in the emergency room by the following provider(s):  Attending Provider: Mi Parks DO  Physician Assistant: CHELA Franco. Spencer Muro is excused from work/school on 01/02/22 and 01/03/22. She is medically clear to return to work/school on 1/4/2022.        Sincerely,          Sneha Lopez RN

## 2022-01-02 NOTE — ED TRIAGE NOTES
Patient ambulatory to triage with mask in place. Patient reports she is 8 weeks pregnant. Pt reports vaginal bleeding that started about 2 hours ago.

## 2022-01-02 NOTE — ED NOTES
Pt states she would like her IV removed at this time. Pt states it is uncomfortable. Pt informed that she may have to have another IV placed if deemed necessary for medical treatment by whichever provider assigned to her.

## 2022-01-03 LAB
BILIRUB UR QL: NEGATIVE
GLUCOSE UR QL STRIP.AUTO: NEGATIVE MG/DL
KETONES UR-MCNC: NEGATIVE MG/DL
LEUKOCYTE ESTERASE UR QL STRIP: NEGATIVE
NITRITE UR QL: NEGATIVE
PH UR: 7 [PH] (ref 5–9)
PROT UR QL: NEGATIVE MG/DL
RBC # UR STRIP: ABNORMAL /UL
SERVICE CMNT-IMP: ABNORMAL
SP GR UR: >1.03 (ref 1–1.02)
UROBILINOGEN UR QL: 0.2 EU/DL (ref 0.2–1)

## 2022-01-03 NOTE — ED NOTES
I have reviewed discharge instructions with the patient. The patient verbalized understanding. Patient left ED via Discharge Method: ambulatory to Home    Opportunity for questions and clarification provided. Pt seemed visibly upset at the time of discharge, and refused signing paperwork and closing vitals. Patient given 0 scripts. To continue your aftercare when you leave the hospital, you may receive an automated call from our care team to check in on how you are doing.  This is a free service and part of our promise to provide the best care and service to meet your aftercare needs. \" If you have questions, or wish to unsubscribe from this service please call [phone].  Thank you for Choosing our [facility] Emergency Department.

## 2022-01-13 PROBLEM — F32.A ANXIETY AND DEPRESSION: Status: ACTIVE | Noted: 2022-01-13

## 2022-01-13 PROBLEM — D69.6 THROMBOCYTOPENIA (HCC): Status: RESOLVED | Noted: 2021-08-13 | Resolved: 2022-01-13

## 2022-01-13 PROBLEM — F41.9 ANXIETY AND DEPRESSION: Status: ACTIVE | Noted: 2022-01-13

## 2022-01-13 PROBLEM — M41.9 SCOLIOSIS: Status: ACTIVE | Noted: 2022-01-13

## 2022-01-13 PROBLEM — Z34.90 PREGNANCY: Status: ACTIVE | Noted: 2022-01-13

## 2022-01-13 PROBLEM — Z86.2 HISTORY OF THROMBOCYTOPENIA: Status: ACTIVE | Noted: 2022-01-13

## 2022-01-13 PROBLEM — O09.219 HISTORY OF PRETERM LABOR, CURRENT PREGNANCY: Status: ACTIVE | Noted: 2022-01-13

## 2022-01-17 PROBLEM — Z28.39 MATERNAL VARICELLA, NON-IMMUNE: Status: ACTIVE | Noted: 2022-01-17

## 2022-01-17 PROBLEM — O09.899 MATERNAL VARICELLA, NON-IMMUNE: Status: ACTIVE | Noted: 2022-01-17

## 2022-02-08 PROBLEM — O99.340 MENTAL DISORDER IN PREGNANCY: Status: ACTIVE | Noted: 2022-01-13

## 2022-02-08 PROBLEM — O09.91 HIGH-RISK PREGNANCY IN FIRST TRIMESTER: Status: ACTIVE | Noted: 2022-01-13

## 2022-03-18 PROBLEM — Z86.2 HISTORY OF THROMBOCYTOPENIA: Status: ACTIVE | Noted: 2022-01-13

## 2022-03-18 PROBLEM — O99.340 MENTAL DISORDER IN PREGNANCY: Status: ACTIVE | Noted: 2022-01-13

## 2022-03-18 PROBLEM — O09.91 HIGH-RISK PREGNANCY IN FIRST TRIMESTER: Status: ACTIVE | Noted: 2022-01-13

## 2022-03-18 PROBLEM — Z82.69: Status: ACTIVE | Noted: 2018-11-12

## 2022-03-19 PROBLEM — M41.9 SCOLIOSIS: Status: ACTIVE | Noted: 2022-01-13

## 2022-03-19 PROBLEM — O09.219 HISTORY OF PRETERM LABOR, CURRENT PREGNANCY: Status: ACTIVE | Noted: 2022-01-13

## 2022-03-19 PROBLEM — Z28.39 MATERNAL VARICELLA, NON-IMMUNE: Status: ACTIVE | Noted: 2022-01-17

## 2022-03-19 PROBLEM — O09.899 MATERNAL VARICELLA, NON-IMMUNE: Status: ACTIVE | Noted: 2022-01-17

## 2022-03-19 PROBLEM — R76.8 POSITIVE ANA (ANTINUCLEAR ANTIBODY): Status: ACTIVE | Noted: 2018-11-14

## 2022-04-06 PROBLEM — O34.32 CERVICAL INSUFFICIENCY DURING PREGNANCY IN SECOND TRIMESTER, ANTEPARTUM: Status: ACTIVE | Noted: 2022-04-06

## 2022-04-06 PROBLEM — Z86.59 HISTORY OF MENTAL DISORDER: Status: ACTIVE | Noted: 2022-01-13

## 2022-04-06 PROBLEM — O09.92 HIGH-RISK PREGNANCY IN SECOND TRIMESTER: Status: ACTIVE | Noted: 2022-01-13

## 2022-04-06 PROBLEM — Z87.39 HISTORY OF SCOLIOSIS: Status: ACTIVE | Noted: 2022-01-13

## 2022-04-06 PROBLEM — O09.212 CURRENT PREGNANCY WITH HISTORY OF PRE-TERM LABOR IN SECOND TRIMESTER: Status: ACTIVE | Noted: 2022-01-13

## 2022-04-14 ENCOUNTER — HOSPITAL ENCOUNTER (OUTPATIENT)
Age: 27
Setting detail: OBSERVATION
LOS: 1 days | Discharge: HOME | End: 2022-04-15
Attending: OBSTETRICS & GYNECOLOGY | Admitting: OBSTETRICS & GYNECOLOGY
Payer: COMMERCIAL

## 2022-04-14 DIAGNOSIS — O34.32 CERVICAL INSUFFICIENCY DURING PREGNANCY IN SECOND TRIMESTER, ANTEPARTUM: ICD-10-CM

## 2022-04-14 PROBLEM — O26.872 CERVICAL SHORTENING IN PREGNANCY, SECOND TRIMESTER: Status: ACTIVE | Noted: 2022-04-14

## 2022-04-14 LAB
ERYTHROCYTE [DISTWIDTH] IN BLOOD BY AUTOMATED COUNT: 12.6 % (ref 11.9–14.6)
HCT VFR BLD AUTO: 36.1 % (ref 35.8–46.3)
HGB BLD-MCNC: 12.4 G/DL (ref 11.7–15.4)
MCH RBC QN AUTO: 31.6 PG (ref 26.1–32.9)
MCHC RBC AUTO-ENTMCNC: 34.3 G/DL (ref 31.4–35)
MCV RBC AUTO: 91.9 FL (ref 79.6–97.8)
NRBC # BLD: 0 K/UL (ref 0–0.2)
PLATELET # BLD AUTO: 172 K/UL (ref 150–450)
PMV BLD AUTO: 11.2 FL (ref 9.4–12.3)
RBC # BLD AUTO: 3.93 M/UL (ref 4.05–5.2)
WBC # BLD AUTO: 11.3 K/UL (ref 4.3–11.1)

## 2022-04-14 PROCEDURE — 74011250636 HC RX REV CODE- 250/636: Performed by: OBSTETRICS & GYNECOLOGY

## 2022-04-14 PROCEDURE — 2709999900 HC NON-CHARGEABLE SUPPLY

## 2022-04-14 PROCEDURE — 74011250637 HC RX REV CODE- 250/637: Performed by: OBSTETRICS & GYNECOLOGY

## 2022-04-14 PROCEDURE — 85027 COMPLETE CBC AUTOMATED: CPT

## 2022-04-14 PROCEDURE — G0378 HOSPITAL OBSERVATION PER HR: HCPCS

## 2022-04-14 PROCEDURE — 74011000250 HC RX REV CODE- 250: Performed by: OBSTETRICS & GYNECOLOGY

## 2022-04-14 RX ORDER — CEFAZOLIN SODIUM/WATER 2 G/20 ML
2 SYRINGE (ML) INTRAVENOUS EVERY 8 HOURS
Status: DISCONTINUED | OUTPATIENT
Start: 2022-04-15 | End: 2022-04-16 | Stop reason: HOSPADM

## 2022-04-14 RX ORDER — AZITHROMYCIN 250 MG/1
250 TABLET, FILM COATED ORAL EVERY 24 HOURS
Status: DISCONTINUED | OUTPATIENT
Start: 2022-04-15 | End: 2022-04-16 | Stop reason: HOSPADM

## 2022-04-14 RX ORDER — AZITHROMYCIN 250 MG/1
500 TABLET, FILM COATED ORAL ONCE
Status: COMPLETED | OUTPATIENT
Start: 2022-04-15 | End: 2022-04-14

## 2022-04-14 RX ORDER — SODIUM CHLORIDE 0.9 % (FLUSH) 0.9 %
5-40 SYRINGE (ML) INJECTION EVERY 8 HOURS
Status: DISCONTINUED | OUTPATIENT
Start: 2022-04-15 | End: 2022-04-16 | Stop reason: HOSPADM

## 2022-04-14 RX ORDER — METRONIDAZOLE 500 MG/100ML
500 INJECTION, SOLUTION INTRAVENOUS EVERY 12 HOURS
Status: DISCONTINUED | OUTPATIENT
Start: 2022-04-15 | End: 2022-04-16 | Stop reason: HOSPADM

## 2022-04-14 RX ORDER — INDOMETHACIN 50 MG/1
50 CAPSULE ORAL EVERY 6 HOURS
Status: DISCONTINUED | OUTPATIENT
Start: 2022-04-15 | End: 2022-04-16 | Stop reason: HOSPADM

## 2022-04-14 RX ORDER — FAMOTIDINE 20 MG/1
20 TABLET, FILM COATED ORAL EVERY 12 HOURS
Status: DISCONTINUED | OUTPATIENT
Start: 2022-04-15 | End: 2022-04-16 | Stop reason: HOSPADM

## 2022-04-14 RX ORDER — LANOLIN ALCOHOL/MO/W.PET/CERES
3 CREAM (GRAM) TOPICAL
Status: DISCONTINUED | OUTPATIENT
Start: 2022-04-14 | End: 2022-04-15

## 2022-04-14 RX ORDER — SODIUM CHLORIDE 0.9 % (FLUSH) 0.9 %
5-40 SYRINGE (ML) INJECTION AS NEEDED
Status: DISCONTINUED | OUTPATIENT
Start: 2022-04-14 | End: 2022-04-16 | Stop reason: HOSPADM

## 2022-04-14 RX ADMIN — CEFAZOLIN SODIUM 2 G: 100 INJECTION, POWDER, LYOPHILIZED, FOR SOLUTION INTRAVENOUS at 23:57

## 2022-04-14 RX ADMIN — FAMOTIDINE 20 MG: 20 TABLET, FILM COATED ORAL at 23:56

## 2022-04-14 RX ADMIN — INDOMETHACIN 50 MG: 50 CAPSULE ORAL at 23:56

## 2022-04-14 RX ADMIN — AZITHROMYCIN MONOHYDRATE 500 MG: 250 TABLET ORAL at 23:56

## 2022-04-15 ENCOUNTER — ANESTHESIA (OUTPATIENT)
Dept: LABOR AND DELIVERY | Age: 27
End: 2022-04-15
Payer: COMMERCIAL

## 2022-04-15 ENCOUNTER — ANESTHESIA EVENT (OUTPATIENT)
Dept: LABOR AND DELIVERY | Age: 27
End: 2022-04-15
Payer: COMMERCIAL

## 2022-04-15 VITALS
DIASTOLIC BLOOD PRESSURE: 72 MMHG | HEART RATE: 80 BPM | RESPIRATION RATE: 18 BRPM | SYSTOLIC BLOOD PRESSURE: 122 MMHG | TEMPERATURE: 97.7 F | OXYGEN SATURATION: 100 %

## 2022-04-15 PROCEDURE — 76010000387 HC LDRP PROCEDURE FIRST 0.5 HR: Performed by: OBSTETRICS & GYNECOLOGY

## 2022-04-15 PROCEDURE — 74011250636 HC RX REV CODE- 250/636: Performed by: OBSTETRICS & GYNECOLOGY

## 2022-04-15 PROCEDURE — 59320 REVISION OF CERVIX: CPT | Performed by: OBSTETRICS & GYNECOLOGY

## 2022-04-15 PROCEDURE — 77030003665 HC NDL SPN BBMI -A: Performed by: ANESTHESIOLOGY

## 2022-04-15 PROCEDURE — 74011250636 HC RX REV CODE- 250/636: Performed by: ANESTHESIOLOGY

## 2022-04-15 PROCEDURE — 77030002986 HC SUT PROL J&J -A: Performed by: OBSTETRICS & GYNECOLOGY

## 2022-04-15 PROCEDURE — 74011250637 HC RX REV CODE- 250/637: Performed by: ANESTHESIOLOGY

## 2022-04-15 PROCEDURE — 74011000250 HC RX REV CODE- 250: Performed by: OBSTETRICS & GYNECOLOGY

## 2022-04-15 PROCEDURE — 99219 PR INITIAL OBSERVATION CARE/DAY 50 MINUTES: CPT | Performed by: OBSTETRICS & GYNECOLOGY

## 2022-04-15 PROCEDURE — G0378 HOSPITAL OBSERVATION PER HR: HCPCS

## 2022-04-15 PROCEDURE — 76060000032 HC ANESTHESIA 0.5 TO 1 HR: Performed by: OBSTETRICS & GYNECOLOGY

## 2022-04-15 PROCEDURE — 2709999900 HC NON-CHARGEABLE SUPPLY: Performed by: OBSTETRICS & GYNECOLOGY

## 2022-04-15 PROCEDURE — 77030007880 HC KT SPN EPDRL BBMI -B: Performed by: ANESTHESIOLOGY

## 2022-04-15 PROCEDURE — 76210000064 HC RECOV POST SURG EA 0.5 HR: Performed by: OBSTETRICS & GYNECOLOGY

## 2022-04-15 PROCEDURE — 74011250637 HC RX REV CODE- 250/637: Performed by: OBSTETRICS & GYNECOLOGY

## 2022-04-15 RX ORDER — CHLOROPROCAINE HYDROCHLORIDE 30 MG/ML
INJECTION, SOLUTION EPIDURAL; INFILTRATION; INTRACAUDAL; PERINEURAL AS NEEDED
Status: DISCONTINUED | OUTPATIENT
Start: 2022-04-15 | End: 2022-04-15 | Stop reason: HOSPADM

## 2022-04-15 RX ORDER — TRISODIUM CITRATE DIHYDRATE AND CITRIC ACID MONOHYDRATE 500; 334 MG/5ML; MG/5ML
30 SOLUTION ORAL
Status: COMPLETED | OUTPATIENT
Start: 2022-04-15 | End: 2022-04-15

## 2022-04-15 RX ORDER — SODIUM CHLORIDE, SODIUM LACTATE, POTASSIUM CHLORIDE, CALCIUM CHLORIDE 600; 310; 30; 20 MG/100ML; MG/100ML; MG/100ML; MG/100ML
125 INJECTION, SOLUTION INTRAVENOUS CONTINUOUS
Status: DISCONTINUED | OUTPATIENT
Start: 2022-04-15 | End: 2022-04-16 | Stop reason: HOSPADM

## 2022-04-15 RX ORDER — PROMETHAZINE HYDROCHLORIDE 25 MG/ML
25 INJECTION, SOLUTION INTRAMUSCULAR; INTRAVENOUS
Status: DISCONTINUED | OUTPATIENT
Start: 2022-04-15 | End: 2022-04-16 | Stop reason: HOSPADM

## 2022-04-15 RX ORDER — POLYETHYLENE GLYCOL 3350 17 G/17G
17 POWDER, FOR SOLUTION ORAL
Status: DISCONTINUED | OUTPATIENT
Start: 2022-04-15 | End: 2022-04-16 | Stop reason: HOSPADM

## 2022-04-15 RX ORDER — HYDROCODONE BITARTRATE AND ACETAMINOPHEN 5; 325 MG/1; MG/1
2 TABLET ORAL
Status: DISCONTINUED | OUTPATIENT
Start: 2022-04-15 | End: 2022-04-16 | Stop reason: HOSPADM

## 2022-04-15 RX ORDER — CHLOROPROCAINE HYDROCHLORIDE 30 MG/ML
INJECTION, SOLUTION EPIDURAL; INFILTRATION; INTRACAUDAL; PERINEURAL AS NEEDED
Status: DISCONTINUED | OUTPATIENT
Start: 2022-04-15 | End: 2022-04-15

## 2022-04-15 RX ORDER — ONDANSETRON 2 MG/ML
4 INJECTION INTRAMUSCULAR; INTRAVENOUS ONCE
Status: COMPLETED | OUTPATIENT
Start: 2022-04-15 | End: 2022-04-15

## 2022-04-15 RX ORDER — LANOLIN ALCOHOL/MO/W.PET/CERES
3 CREAM (GRAM) TOPICAL
Status: DISCONTINUED | OUTPATIENT
Start: 2022-04-15 | End: 2022-04-16 | Stop reason: HOSPADM

## 2022-04-15 RX ORDER — INDOMETHACIN 50 MG/1
50 CAPSULE ORAL EVERY 6 HOURS
Qty: 9 CAPSULE | Refills: 0 | Status: SHIPPED | OUTPATIENT
Start: 2022-04-15 | End: 2022-04-18

## 2022-04-15 RX ORDER — ONDANSETRON 2 MG/ML
8 INJECTION INTRAMUSCULAR; INTRAVENOUS
Status: DISCONTINUED | OUTPATIENT
Start: 2022-04-15 | End: 2022-04-16 | Stop reason: HOSPADM

## 2022-04-15 RX ADMIN — SODIUM CHLORIDE, SODIUM LACTATE, POTASSIUM CHLORIDE, AND CALCIUM CHLORIDE 1000 ML: 600; 310; 30; 20 INJECTION, SOLUTION INTRAVENOUS at 11:00

## 2022-04-15 RX ADMIN — FAMOTIDINE 20 MG: 20 TABLET, FILM COATED ORAL at 08:02

## 2022-04-15 RX ADMIN — ONDANSETRON 4 MG: 2 INJECTION INTRAMUSCULAR; INTRAVENOUS at 12:40

## 2022-04-15 RX ADMIN — INDOMETHACIN 50 MG: 50 CAPSULE ORAL at 17:35

## 2022-04-15 RX ADMIN — SODIUM CITRATE AND CITRIC ACID MONOHYDRATE 30 ML: 500; 334 SOLUTION ORAL at 12:40

## 2022-04-15 RX ADMIN — CEFAZOLIN SODIUM 2 G: 100 INJECTION, POWDER, LYOPHILIZED, FOR SOLUTION INTRAVENOUS at 16:07

## 2022-04-15 RX ADMIN — CEFAZOLIN SODIUM 2 G: 100 INJECTION, POWDER, LYOPHILIZED, FOR SOLUTION INTRAVENOUS at 08:02

## 2022-04-15 RX ADMIN — SODIUM CHLORIDE, SODIUM LACTATE, POTASSIUM CHLORIDE, AND CALCIUM CHLORIDE 500 ML: 600; 310; 30; 20 INJECTION, SOLUTION INTRAVENOUS at 12:37

## 2022-04-15 RX ADMIN — CHLOROPROCAINE HYDROCHLORIDE 60 MG: 30 INJECTION, SOLUTION EPIDURAL; INFILTRATION; INTRACAUDAL; PERINEURAL at 13:35

## 2022-04-15 RX ADMIN — SODIUM CHLORIDE, SODIUM LACTATE, POTASSIUM CHLORIDE, AND CALCIUM CHLORIDE: 600; 310; 30; 20 INJECTION, SOLUTION INTRAVENOUS at 13:27

## 2022-04-15 RX ADMIN — METRONIDAZOLE 500 MG: 500 INJECTION, SOLUTION INTRAVENOUS at 00:00

## 2022-04-15 RX ADMIN — HYDROCODONE BITARTRATE AND ACETAMINOPHEN 2 TABLET: 5; 325 TABLET ORAL at 17:40

## 2022-04-15 RX ADMIN — INDOMETHACIN 50 MG: 50 CAPSULE ORAL at 11:25

## 2022-04-15 RX ADMIN — AZITHROMYCIN MONOHYDRATE 250 MG: 250 TABLET ORAL at 17:35

## 2022-04-15 RX ADMIN — METRONIDAZOLE 500 MG: 500 INJECTION, SOLUTION INTRAVENOUS at 11:03

## 2022-04-15 RX ADMIN — INDOMETHACIN 50 MG: 50 CAPSULE ORAL at 05:54

## 2022-04-15 NOTE — PROGRESS NOTES
Patient admitted for cerclage at 22.5.     SW will continue to follow thru d/c.    GARLAND Rivas, [address] [facility]   [phone]

## 2022-04-15 NOTE — H&P
Interval H&P       Discussion of interval history and symptoms. No changes from previously completed H&P. Pt doing well this am.     All questions answered. Risks, benefits, and alternatives discussed. Patient and partner desire ultrasound indicated cerclage. Will proceed at this time. Plan dc home this pm if all goes well.      Rachell Harvey MD

## 2022-04-15 NOTE — PROGRESS NOTES
Pt taken to bathroom with RN, walking well, unable to void on toilet. Straight cath performed and 220ml out. Pt tolerating PO well and states pain around 6/10. Plan to give 2 tabs Norco now.

## 2022-04-15 NOTE — PROGRESS NOTES
Pt brought to room for recovery at 1400. Telemetry monitor placed and RN at bedside. Pt doing well, denies pain and v/s stable. Will cont to monitor.

## 2022-04-15 NOTE — OP NOTES
Ziggy Cervical Cerclage Operative Note    Pre-operative Diagnosis: Cervical Shortening  Post-operative Diagnosis: Same as Above    Surgeon:  Robina Caballero MD     Anesthesia: Spinal    Procedure: Trinh Cerclage    Indications:  Esau Chaparro presents with a clinical history consistent with cervical incompetence. Cervical cerclage was offered for treatment of cervical incompetence and the risks were explained in detail in the office and again in the hospital prior to surgery. These included risk of infection, bleeding, bladder and bowel damage and pregnancy loss, along with rupture of membranes now or later in pregnancy. These complications were all explained in detail and questions answered. It was explained to the patient that she had the option of expectant management without cerclage placement. She understood that her care and treatment would not be affected by her choice and there was no pressure on her to choose this course of treatment. After a long discussion and clear understanding by the patient, the patient consented to the procedure prior to coming to the hospital and, again on admission to the hospital.     Procedure Details:   Esau Chaparro was taken to the Operating Room where spinal anesthetic was administered. The patient was then placed in the dorsal lithotomy position. The vulva and distal vagina were then gently prepped with Betadine solution and draped in a sterile fashion to expose the vaginal introitus. The patient was then placed in Trendelenburg position to allow better visualization of the vaginal canal and the cervix. Using retractors, the cervix and distal vagina were visualized. The vaginal portion of the cervical length was severely shortened. The external cervical os appeared to be open but the internal os was closed to manual exam. Fetal membranes were not visualized.   The cervix and distal vagina were again gently washed carefully with Betadine solution and dried with sterile sponges. A long atraumatic Allis clamp was used to retract the cervix by grasping a significant portio of the cervix, carefully placed to avoid membranes at the 10 o'clock position. Using 5mm mersilene, the first bite of the Trinh stitch was placed at the 12 o'clock position on the cervix at the junction of the vaginal mucosa and the portio of the cervix with the suture placed through the cervical stroma, careful to avoid cervical canal and amniotic sac, between mucosa and cervical canal.  Shallow bites placed in a purse string fashion with sequential grasping and releasing of the cervix with the Allis clamp to retract the cervical stroma and allow for placement of suture at the junction of the vaginal mucosa and the portio of the cervix. Care was used in grasping the cervix to be atraumatic and to cause as little of trauma and bleeding as possible. The last stitch ended at approximately the 12 o'clock again, needle cut and both sutures were gently retracted to take up any slack in the suture and a finger placed in the cervix and retraction of both ends of suture and internal os noted to be closed with suture tension. The suture was evaluated visually in all quadrants and felt to be at the junction of the vaginal mucosa and the portio of the cervix without including any sidewall and without including bladder or bowel. The suture was then tied with 5 square knots. The cervix was checked again and visualized and the cervix remained pink with no significantly bleeding, internal os palpated and was tightly closed. A 2-0 silk suture was place through both ends of suture aproximately 1 cm distal to knots and then tied. Cervix and vagina were again evaluated and no bleeding was noted. The cervix remained pink. Instruments and retractors were removed. The bladder was then drained and 50ml clear urine noted on drainage.  Rectal exam was performed and no sutures were noted in the rectum. At the end of the procedure, sponge, instrument and needle counts were noted to be correct. Estimated Blood Loss:  10cc    Suture Type: 5mm Mersilene    Number of Sutures: 1    Findings:  Uncomplicated cervical cerclage; pt tolerated well.        Signed By: Bebe Pacnhal MD 4/15/2022

## 2022-04-15 NOTE — ANESTHESIA POSTPROCEDURE EVALUATION
Procedure(s):  CERCLAGE.    spinal    Anesthesia Post Evaluation      Multimodal analgesia: multimodal analgesia used between 6 hours prior to anesthesia start to PACU discharge  Patient location during evaluation: bedside  Patient participation: complete - patient participated  Level of consciousness: awake and alert  Pain management: adequate  Airway patency: patent  Anesthetic complications: no  Cardiovascular status: acceptable  Respiratory status: nonlabored ventilation and acceptable  Hydration status: acceptable  Post anesthesia nausea and vomiting:  none      INITIAL Post-op Vital signs:   Vitals Value Taken Time   /62 04/15/22 1700   Temp 36.4 °C (97.6 °F) 04/15/22 1403   Pulse 78 04/15/22 1700   Resp 18 04/15/22 1700   SpO2 100 % 04/15/22 1600

## 2022-04-15 NOTE — PROGRESS NOTES
Patient mom in room with patient with dinner. Patient sitting up in bed eating dinner with mom at bedside.

## 2022-04-15 NOTE — ANESTHESIA PROCEDURE NOTES
Spinal Block    Start time: 4/15/2022 1:30 PM  End time: 4/15/2022 1:35 PM  Performed by: Maya Montero MD  Authorized by: Maya Montero MD     Pre-procedure:   Indications: primary anesthetic  Preanesthetic Checklist: patient identified, risks and benefits discussed, anesthesia consent, patient being monitored and timeout performed    Timeout Time: 13:30 EDT          Spinal Block:   Patient Position:  Seated  Prep Region:  Lumbar  Prep: chlorhexidine and patient draped      Location:  L3-4  Technique:  Single shot    Local Dose (mL):  3    Needle:   Needle Type:  Pencan  Needle Gauge:  25 G  Attempts:  1      Events: CSF confirmed, no blood with aspiration and no paresthesia        Assessment:  Insertion:  Uncomplicated  Patient tolerance:  Patient tolerated the procedure well with no immediate complications

## 2022-04-15 NOTE — ANESTHESIA PREPROCEDURE EVALUATION
Relevant Problems   No relevant active problems       Anesthetic History   No history of anesthetic complications            Review of Systems / Medical History  Patient summary reviewed, nursing notes reviewed and pertinent labs reviewed    Pulmonary  Within defined limits                 Neuro/Psych         Headaches and psychiatric history     Cardiovascular  Within defined limits                Exercise tolerance: >4 METS     GI/Hepatic/Renal                Endo/Other             Other Findings              Physical Exam    Airway  Mallampati: II      Mouth opening: Normal     Cardiovascular  Regular rate and rhythm,  S1 and S2 normal,  no murmur, click, rub, or gallop             Dental  No notable dental hx       Pulmonary  Breath sounds clear to auscultation               Abdominal         Other Findings            Anesthetic Plan    ASA: 2  Anesthesia type: spinal            Anesthetic plan and risks discussed with: Patient and Spouse

## 2022-04-16 ENCOUNTER — HOSPITAL ENCOUNTER (OUTPATIENT)
Age: 27
Setting detail: OBSERVATION
Discharge: HOME | End: 2022-04-17
Attending: OBSTETRICS & GYNECOLOGY | Admitting: OBSTETRICS & GYNECOLOGY
Payer: COMMERCIAL

## 2022-04-16 VITALS
SYSTOLIC BLOOD PRESSURE: 123 MMHG | OXYGEN SATURATION: 100 % | HEART RATE: 84 BPM | DIASTOLIC BLOOD PRESSURE: 70 MMHG | RESPIRATION RATE: 16 BRPM | TEMPERATURE: 98 F

## 2022-04-16 PROBLEM — O26.892 HEADACHE IN PREGNANCY, SECOND TRIMESTER: Status: ACTIVE | Noted: 2022-04-16

## 2022-04-16 PROBLEM — R51.9 HEADACHE IN PREGNANCY, SECOND TRIMESTER: Status: ACTIVE | Noted: 2022-04-16

## 2022-04-16 PROCEDURE — G0378 HOSPITAL OBSERVATION PER HR: HCPCS

## 2022-04-16 PROCEDURE — 74011250636 HC RX REV CODE- 250/636: Performed by: OBSTETRICS & GYNECOLOGY

## 2022-04-16 RX ADMIN — SODIUM CHLORIDE, SODIUM LACTATE, POTASSIUM CHLORIDE, AND CALCIUM CHLORIDE 1000 ML: 600; 310; 30; 20 INJECTION, SOLUTION INTRAVENOUS at 23:01

## 2022-04-16 NOTE — DISCHARGE INSTRUCTIONS
Patient Education        Cervical Cerclage to Prevent  Delivery: What to Expect at Home  Your Recovery  Cervical cerclage (say \"SER-vuh-kul ser-KLAZH\") is a procedure that helps keep your cervix from opening too soon. Your doctor has sewn your cervix shut to help prevent  labor. For the next few days, you may have:  · Cramping. · Spotting. · Pain when you urinate. Your doctor may give you instructions on when you can do your normal activities again, such as driving and going back to work. Your doctor will remove the stitches around your cervix at 37 weeks, or if you go into  labor or show signs of infection. This care sheet gives you a general idea about how long it will take for you to recover. But each person recovers at a different pace. Follow the steps below to get better as quickly as possible. How can you care for yourself at home? Activity    · Rest when you feel tired.     · Ask your doctor when it is okay for you to have sex. Medicines    · Be safe with medicines. Read and follow all instructions on the label.     · If you are not taking a prescription pain medicine, ask your doctor if you can take an over-the-counter medicine.     · Your doctor will tell you if and when you can restart your medicines. He or she will also give you instructions about taking any new medicines. Follow-up care is a key part of your treatment and safety. Be sure to make and go to all appointments, and call your doctor if you are having problems. It's also a good idea to know your test results and keep a list of the medicines you take. When should you call for help? Call 911 anytime you think you may need emergency care. For example, call if:    · You have severe trouble breathing.     · You have sudden, severe pain in your belly.     · You have severe vaginal bleeding.    Call your doctor now or seek immediate medical care if:    · You have new pelvic pain, or the pain in your pelvis gets worse.     · You have a new discharge from your vagina.     · You have a new or higher fever. Watch closely for changes in your health, and be sure to contact your doctor if you have any problems. Where can you learn more? Go to http://www.gray.com/  Enter M386 in the search box to learn more about \"Cervical Cerclage to Prevent  Delivery: What to Expect at Home. \"  Current as of: 2021               Content Version: 13.2   [employer]. Care instructions adapted under license by [employer] (which disclaims liability or warranty for this information). If you have questions about a medical condition or this instruction, always ask your healthcare professional. [address] any warranty or liability for your use of this information.

## 2022-04-16 NOTE — PROGRESS NOTES
Discharge instruction given. Information/teaching printout given to patient. States understanding. All questions answered. Patient wheeled to personal vehicle in stable condition with nurse and mother at side.

## 2022-04-17 PROCEDURE — 99284 EMERGENCY DEPT VISIT MOD MDM: CPT

## 2022-04-17 PROCEDURE — G0378 HOSPITAL OBSERVATION PER HR: HCPCS

## 2022-04-17 PROCEDURE — 96360 HYDRATION IV INFUSION INIT: CPT

## 2022-04-17 NOTE — DISCHARGE INSTRUCTIONS
Patient Education        Learning About Spinal Headaches  What is a spinal headache? Headaches may happen after certain procedures that involve the spine. These procedures include myelograms, spinal taps, and epidurals for anesthesia. In these procedures, a bit of spinal fluid may leak out of the space around the spinal cord. The leak usually isn't dangerous. But if enough fluid leaks out, it changes the pressure around your spinal cord. That can cause a headache. Many people who have a spinal procedure don't get a headache afterwards. For the people who do, the headache can be relieved by self-care at home. It usually goes away in a few days. What are the symptoms? A spinal headache usually starts in the first few days after the procedure that caused it. You may feel a dull, throbbing pain. It can start in the front or back of the head, and you may feel it down into your neck and shoulders. The headache may get worse when you move your head or when you sit or stand. It should ease when you lie down. You may feel dizzy or sick to your stomach. You may have pain in your lower back. And you may hear a ringing in your ears. Even if your symptoms are mild, tell your doctor if they last for more than 2 or 3 days. If you have a more severe headache, make sure to call your doctor. How are they treated? A mild spinal headache can be relieved by self-care at home. It usually goes away in a few days. A good first step is to lie down in a quiet, dark room until the headache is gone. Your doctor may also suggest caffeine to relieve your headache. He or she may give you a prescription for caffeine tablets. Or your doctor may have you try drinks with caffeine, like coffee or espresso. If your doctor agrees, you can take over-the-counter pain medicine. Be sure to follow the directions on the label. Don't forget to drink liquids to keep your body hydrated. Avoid drinks with alcohol.  They won't help your body stay hydrated. And they may make your headache worse. If your home treatment doesn't relieve the headache, talk to your doctor about getting treated with a blood patch. This procedure uses your own blood to help your headache. To apply a blood patch, your doctor takes blood from your arm and injects it into the area of your lower back where the leak happened. The blood restores the pressure around your spinal cord. It also helps seal any leak that may still be there. Many people feel better right away, but it could take a day or two. And a few people need to have a second blood patch. Follow-up care is a key part of your treatment and safety. Be sure to make and go to all appointments, and call your doctor if you are having problems. It's also a good idea to know your test results and keep a list of the medicines you take. Where can you learn more? Go to http://www.gray.com/  Enter C834 in the search box to learn more about \"Learning About Spinal Headaches. \"  Current as of: February 11, 2021               Content Version: 13.2  © 2006-2022 [employer]. Care instructions adapted under license by [employer] (which disclaims liability or warranty for this information). If you have questions about a medical condition or this instruction, always ask your healthcare professional. [address] any warranty or liability for your use of this information.

## 2022-04-17 NOTE — H&P
History & Physical    Name: Curtis Vivar MRN: [id number]  SSN: [id number]    YOB: 1995  Age: 33 y.o. Sex: female        Subjective: Headache in pregnancy  32 yo  at 22+6 wks presents with severe headache following rescue cerclage procedure yesterday. Pt noted a slight headache last night but woke up with a severe headache this AM. The headache is the worst when sitting or standing. She gets some relief when lying down. The procedure was reviewed and a spinal anesthetic was performed in a single shot with 25G needle. She denies any obstetric complaints. Her pregnancy has been c/b short cervix, h/o thrombocytopenia, positive BUD. Estimated Date of Delivery: 22  OB History    Para Term  AB Living   2 1 1 0 0 1   SAB IAB Ectopic Molar Multiple Live Births   0 0 0 0 0 1      # Outcome Date GA Lbr Wally/2nd Weight Sex Delivery Anes PTL Lv   2 Current            1 Term 19 37w1d 13:48 / 01:19 2.965 kg M Vag-Spont EPI N PRAKASH         Past Medical History:   Diagnosis Date    Anxiety     Chlamydia     Depression     medication 2017-2017. doing well off meds    Headache     Postpartum depression     Scoliosis      No past surgical history on file.   Social History     Occupational History    Not on file   Tobacco Use    Smoking status: Former Smoker     Packs/day: 0.25     Years: 4.00     Pack years: 1.00    Smokeless tobacco: Never Used    Tobacco comment: none since +pt   Substance and Sexual Activity    Alcohol use: No     Comment: occ beer once a month/ none since +pt    Drug use: Not Currently    Sexual activity: Yes     Partners: Male     Birth control/protection: None     Family History   Problem Relation Age of Onset    Hypertension Mother     Cancer Maternal Grandfather         bone    Cancer Paternal Grandmother         cervical    Other Cousin         polydactyl    Bipolar Disorder Father        No Known Allergies  Prior to Admission medications    Medication Sig Start Date End Date Taking? Authorizing Provider   indomethacin (INDOCIN) 50 mg capsule Take 1 Capsule by mouth every six (6) hours for 9 doses. 4/15/22 4/18/22 Yes Albertina Cosme MD   prenatal vit 91/iron/folic/dha (PRENATAL + DHA PO) Take  by mouth. Yes Provider, Historical   acetaminophen (TylenoL) 325 mg tablet Take 1,000 mg by mouth every four (4) hours as needed for Pain. Yes Provider, Historical   calcium carbonate (TUMS) 200 mg calcium (500 mg) chew Take 1 Tablet by mouth daily. Patient not taking: Reported on 2022    Provider, Historical   progesterone (PROMETRIUM) 200 mg capsule Insert capsule nightly into vagina until 36 weeks  Patient not taking: Reported on 2022   Bernadine Rosa MD        Review of Systems: Pertinent items are noted in HPI. 10 point ROS is otherwise negative. Objective:     Vitals:  Vitals:    22 2210   BP: 123/70   Pulse: 84   Resp: 16   Temp: 98 °F (36.7 °C)   SpO2: 100%        Physical Exam:  Patient without distress. Heart: Regular rate and rhythm  Lung: clear to auscultation throughout lung fields and normal respiratory effort  Abdomen: soft, nontender  Fundus: soft and non tender  Lower Extremities:  - Edema No  Membranes:  Intact  Fetal Heart Rate: doptones 140s-150s    Prenatal Labs:   Lab Results   Component Value Date/Time    ABO/Rh(D) O POSITIVE 2022 04:18 PM    Rubella, External Immune 2022 12:00 AM    GrBStrep, External Neg 2019 12:00 AM    HBsAg, External Negative 2022 12:00 AM    HIV, External Non-Reactive 2022 12:00 AM    RPR, External Non-Reactive 2022 12:00 AM    Gonorrhea, External negative 2018 12:00 AM    Chlamydia, External negative 2018 12:00 AM    ABO,Rh O positive 2022 12:00 AM         Assessment/Plan: 32 yo  at 22+6 wks presents with headache c/w spinal headache following spinal anesthetic yesterday.       Active Problems: Headache in pregnancy, second trimester (4/16/2022)         Plan:   Reviewed expectant management vs IVFs/IV caffeine vs blood patch with pt. She would like to proceed with a blood patch. Care relayed to Dr. Sukhjinder Loredo who will evaluate and consent the patient.

## 2022-04-17 NOTE — PROGRESS NOTES
Patient given discharge instruction at this time. Instructed to return if headache is severe and would like to revisit the option of blood patch. Instructed to drink plenty of fluids and to add caffeine for relief. Patient has no questions at this time. Discharged home to self care at this time.

## 2022-04-17 NOTE — PROGRESS NOTES
Patient presents to triage with complaints of severe headache. Post cerclage yesterday. Pain is better when lying down, but excruciating when she sit up or stands up. Patient states that baby is moving normally.

## 2022-04-27 PROBLEM — O34.32 CERVICAL CERCLAGE SUTURE PRESENT IN SECOND TRIMESTER: Status: ACTIVE | Noted: 2022-04-27

## 2022-05-26 ENCOUNTER — ROUTINE PRENATAL (OUTPATIENT)
Dept: OBGYN CLINIC | Age: 27
End: 2022-05-26
Payer: COMMERCIAL

## 2022-05-26 VITALS — WEIGHT: 204.4 LBS | SYSTOLIC BLOOD PRESSURE: 116 MMHG | BODY MASS INDEX: 32.99 KG/M2 | DIASTOLIC BLOOD PRESSURE: 70 MMHG

## 2022-05-26 DIAGNOSIS — Z34.82 PRENATAL CARE, SUBSEQUENT PREGNANCY, SECOND TRIMESTER: ICD-10-CM

## 2022-05-26 DIAGNOSIS — Z3A.28 28 WEEKS GESTATION OF PREGNANCY: ICD-10-CM

## 2022-05-26 DIAGNOSIS — Z13.1 SCREENING FOR DIABETES MELLITUS: ICD-10-CM

## 2022-05-26 DIAGNOSIS — Z13.0 SCREENING FOR IRON DEFICIENCY ANEMIA: ICD-10-CM

## 2022-05-26 DIAGNOSIS — Z13.89 SCREENING FOR GENITOURINARY CONDITION: Primary | ICD-10-CM

## 2022-05-26 LAB
GLUCOSE 1 HOUR: 94 MG/DL
GLUCOSE URINE, POC: NEGATIVE
HGB BLD-MCNC: 12.1 G/DL (ref 11.7–15.4)
PROTEIN,URINE, POC: NORMAL

## 2022-05-26 PROCEDURE — 99213 OFFICE O/P EST LOW 20 MIN: CPT | Performed by: OBSTETRICS & GYNECOLOGY

## 2022-05-26 NOTE — PROGRESS NOTES
Rosalia today-   Saw mfm for visit on 5/19 aok- wants repeat growth with us in 2 weeks  Cerclage in place cl stable per mfm  Stable BP  Growth at next visit  No bleeding or contractions

## 2022-05-27 ENCOUNTER — HOSPITAL ENCOUNTER (OUTPATIENT)
Age: 27
Discharge: HOME | End: 2022-05-27
Attending: OBSTETRICS & GYNECOLOGY | Admitting: OBSTETRICS & GYNECOLOGY
Payer: COMMERCIAL

## 2022-05-27 VITALS
SYSTOLIC BLOOD PRESSURE: 109 MMHG | HEIGHT: 67 IN | TEMPERATURE: 98.2 F | HEART RATE: 88 BPM | DIASTOLIC BLOOD PRESSURE: 55 MMHG | WEIGHT: 205 LBS | RESPIRATION RATE: 18 BRPM | OXYGEN SATURATION: 99 % | BODY MASS INDEX: 32.18 KG/M2

## 2022-05-27 PROBLEM — R11.2 NAUSEA & VOMITING: Status: ACTIVE | Noted: 2022-05-27

## 2022-05-27 PROBLEM — Z3A.28 PREGNANCY WITH 28 COMPLETED WEEKS GESTATION: Status: ACTIVE | Noted: 2022-05-27

## 2022-05-27 LAB
ALBUMIN SERPL-MCNC: 3.3 G/DL (ref 3.5–5)
ALBUMIN/GLOB SERPL: 0.7 {RATIO} (ref 1.2–3.5)
ALP SERPL-CCNC: 86 U/L (ref 50–130)
ALT SERPL-CCNC: 16 U/L (ref 12–65)
ANION GAP SERPL CALC-SCNC: 9 MMOL/L (ref 7–16)
AST SERPL-CCNC: 21 U/L (ref 15–37)
BASOPHILS # BLD: 0 K/UL (ref 0–0.2)
BASOPHILS NFR BLD: 0 % (ref 0–2)
BILIRUB DIRECT SERPL-MCNC: 0.1 MG/DL
BILIRUB SERPL-MCNC: 0.3 MG/DL (ref 0.2–1.1)
BUN SERPL-MCNC: 9 MG/DL (ref 6–23)
CALCIUM SERPL-MCNC: 9.3 MG/DL (ref 8.3–10.4)
CHLORIDE SERPL-SCNC: 108 MMOL/L (ref 98–107)
CO2 SERPL-SCNC: 20 MMOL/L (ref 21–32)
CREAT SERPL-MCNC: 0.63 MG/DL (ref 0.6–1)
DIFFERENTIAL METHOD BLD: ABNORMAL
EOSINOPHIL # BLD: 0 K/UL (ref 0–0.8)
EOSINOPHIL NFR BLD: 0 % (ref 0.5–7.8)
ERYTHROCYTE [DISTWIDTH] IN BLOOD BY AUTOMATED COUNT: 13.1 % (ref 11.9–14.6)
GLOBULIN SER CALC-MCNC: 4.5 G/DL (ref 2.3–3.5)
GLUCOSE SERPL-MCNC: 85 MG/DL (ref 65–100)
HCT VFR BLD AUTO: 41.5 % (ref 35.8–46.3)
HGB BLD-MCNC: 14.3 G/DL (ref 11.7–15.4)
IMM GRANULOCYTES # BLD AUTO: 0.1 K/UL (ref 0–0.5)
IMM GRANULOCYTES NFR BLD AUTO: 1 % (ref 0–5)
LYMPHOCYTES # BLD: 1.4 K/UL (ref 0.5–4.6)
LYMPHOCYTES NFR BLD: 12 % (ref 13–44)
MCH RBC QN AUTO: 31.1 PG (ref 26.1–32.9)
MCHC RBC AUTO-ENTMCNC: 34.5 G/DL (ref 31.4–35)
MCV RBC AUTO: 90.2 FL (ref 79.6–97.8)
MONOCYTES # BLD: 0.9 K/UL (ref 0.1–1.3)
MONOCYTES NFR BLD: 8 % (ref 4–12)
NEUTS SEG # BLD: 8.9 K/UL (ref 1.7–8.2)
NEUTS SEG NFR BLD: 78 % (ref 43–78)
NRBC # BLD: 0 K/UL (ref 0–0.2)
PLATELET # BLD AUTO: 181 K/UL (ref 150–450)
PMV BLD AUTO: 11.3 FL (ref 9.4–12.3)
POTASSIUM SERPL-SCNC: 4.1 MMOL/L (ref 3.5–5.1)
PROT SERPL-MCNC: 7.8 G/DL (ref 6.3–8.2)
RBC # BLD AUTO: 4.6 M/UL (ref 4.05–5.2)
SODIUM SERPL-SCNC: 137 MMOL/L (ref 136–145)
WBC # BLD AUTO: 11.4 K/UL (ref 4.3–11.1)

## 2022-05-27 PROCEDURE — 96374 THER/PROPH/DIAG INJ IV PUSH: CPT

## 2022-05-27 PROCEDURE — 2580000003 HC RX 258: Performed by: OBSTETRICS & GYNECOLOGY

## 2022-05-27 PROCEDURE — 96360 HYDRATION IV INFUSION INIT: CPT

## 2022-05-27 PROCEDURE — 80048 BASIC METABOLIC PNL TOTAL CA: CPT

## 2022-05-27 PROCEDURE — 80076 HEPATIC FUNCTION PANEL: CPT

## 2022-05-27 PROCEDURE — 85025 COMPLETE CBC W/AUTO DIFF WBC: CPT

## 2022-05-27 PROCEDURE — 99285 EMERGENCY DEPT VISIT HI MDM: CPT

## 2022-05-27 PROCEDURE — 59025 FETAL NON-STRESS TEST: CPT

## 2022-05-27 PROCEDURE — 6360000002 HC RX W HCPCS: Performed by: OBSTETRICS & GYNECOLOGY

## 2022-05-27 RX ORDER — SODIUM CHLORIDE, SODIUM LACTATE, POTASSIUM CHLORIDE, CALCIUM CHLORIDE 600; 310; 30; 20 MG/100ML; MG/100ML; MG/100ML; MG/100ML
INJECTION, SOLUTION INTRAVENOUS CONTINUOUS
Status: DISCONTINUED | OUTPATIENT
Start: 2022-05-27 | End: 2022-05-27 | Stop reason: HOSPADM

## 2022-05-27 RX ORDER — ONDANSETRON 8 MG/1
4 TABLET, ORALLY DISINTEGRATING ORAL EVERY 8 HOURS PRN
Status: DISCONTINUED | OUTPATIENT
Start: 2022-05-27 | End: 2022-05-27 | Stop reason: HOSPADM

## 2022-05-27 RX ORDER — ACETAMINOPHEN 325 MG/1
650 TABLET ORAL EVERY 4 HOURS PRN
Status: DISCONTINUED | OUTPATIENT
Start: 2022-05-27 | End: 2022-05-27 | Stop reason: HOSPADM

## 2022-05-27 RX ORDER — ONDANSETRON 4 MG/1
4 TABLET, FILM COATED ORAL 3 TIMES DAILY PRN
Qty: 15 TABLET | Refills: 0 | Status: ON HOLD | OUTPATIENT
Start: 2022-05-27 | End: 2022-07-29 | Stop reason: HOSPADM

## 2022-05-27 RX ORDER — ONDANSETRON 2 MG/ML
4 INJECTION INTRAMUSCULAR; INTRAVENOUS EVERY 6 HOURS PRN
Status: DISCONTINUED | OUTPATIENT
Start: 2022-05-27 | End: 2022-05-27 | Stop reason: HOSPADM

## 2022-05-27 RX ADMIN — SODIUM CHLORIDE, POTASSIUM CHLORIDE, SODIUM LACTATE AND CALCIUM CHLORIDE: 600; 310; 30; 20 INJECTION, SOLUTION INTRAVENOUS at 09:30

## 2022-05-27 RX ADMIN — ONDANSETRON 4 MG: 2 INJECTION INTRAMUSCULAR; INTRAVENOUS at 09:30

## 2022-05-27 NOTE — PROGRESS NOTES
Pt to room [facility] for triage with chief complaint of N/V/D. Assessment begins, EFM and [place] applied to a soft non tender abdomen and tracing well. Dr Jackie Tran called to assess patient.

## 2022-05-27 NOTE — PROGRESS NOTES
S: Patient feeling much better. Nausea and diarrhea have resolved. Feels hungry. Good movement, no ctx, bleeding, LOF. O: /76   Pulse (!) 107   Temp 98.2 °F (36.8 °C) (Oral)   Resp 18   Ht 5' 7\" (1.702 m)   Wt 205 lb (93 kg)   LMP 2021   SpO2 99%   BMI 32.11 kg/m²      Gen- NAD  Abd- soft, gravid, NT  Ext- NT, no edema    NST: reassuring  [place]: no contractions    Lab Results   Component Value Date    WBC 11.4 (H) 2022    HGB 14.3 2022    HCT 41.5 2022    MCV 90.2 2022     2022       Lab Results   Component Value Date    ALKPHOS 86 2022    ALT 16 2022    AST 21 2022    PROT 7.8 2022    BILITOT 0.3 2022    BILIDIR 0.1 2022    LABALBU 3.3 2022     Lab Results   Component Value Date     2022    K 4.1 2022     (H) 2022    CO2 20 (L) 2022    BUN 9 2022    CREATININE 0.63 2022    GLUCOSE 85 2022    CALCIUM 9.3 2022    PROT 7.8 2022    LABALBU 3.3 (L) 2022    BILITOT 0.3 2022    ALKPHOS 86 2022    AST 21 2022    ALT 16 2022    LABGLOM >60 2022    GFRAA >60 2022    GLOB 4.5 (H) 2022           A/P: 32 yo  @ 28   1) Reassuring NST  2) N/V/Diarrhea- resolved with antiemetics and fluids. Normal electrolytes, LFTs and WBCs. Will d/c with precautions if able to tolerate PO. Sending a Zofran rx.    3) F/u with [facility] as scheduled

## 2022-06-02 ENCOUNTER — TELEPHONE (OUTPATIENT)
Dept: OBGYN CLINIC | Age: 27
End: 2022-06-02

## 2022-06-02 ENCOUNTER — CLINICAL DOCUMENTATION (OUTPATIENT)
Dept: OBGYN CLINIC | Age: 27
End: 2022-06-02

## 2022-06-02 NOTE — TELEPHONE ENCOUNTER
OB patient called and LM stating she is craving ice, and has nausea if she does not eat it. She is concerned of hgb being low. Recent hgb on 5/26 is 12.1. She did receive zofran Rx from hospital, but has not taken it. She did pick this up yesterday. Spoke with patient and advised to take the zofran to see if this will help. Try to avoid eating a lot of ice. Recent hgb was normal.  Call back if zofran not helping. All questions answered, patient v/u.

## 2022-06-09 ENCOUNTER — ROUTINE PRENATAL (OUTPATIENT)
Dept: OBGYN CLINIC | Age: 27
End: 2022-06-09
Payer: COMMERCIAL

## 2022-06-09 VITALS — BODY MASS INDEX: 32.36 KG/M2 | SYSTOLIC BLOOD PRESSURE: 112 MMHG | DIASTOLIC BLOOD PRESSURE: 68 MMHG | WEIGHT: 206.6 LBS

## 2022-06-09 DIAGNOSIS — Z34.83 PRENATAL CARE, SUBSEQUENT PREGNANCY, THIRD TRIMESTER: ICD-10-CM

## 2022-06-09 DIAGNOSIS — Z13.89 SCREENING FOR GENITOURINARY CONDITION: Primary | ICD-10-CM

## 2022-06-09 DIAGNOSIS — O09.92 HIGH-RISK PREGNANCY IN SECOND TRIMESTER: ICD-10-CM

## 2022-06-09 DIAGNOSIS — Z3A.30 30 WEEKS GESTATION OF PREGNANCY: ICD-10-CM

## 2022-06-09 PROBLEM — Z3A.28 PREGNANCY WITH 28 COMPLETED WEEKS GESTATION: Status: RESOLVED | Noted: 2022-05-27 | Resolved: 2022-06-09

## 2022-06-09 LAB
GLUCOSE URINE, POC: NEGATIVE
PROTEIN,URINE, POC: NORMAL

## 2022-06-09 PROCEDURE — 99213 OFFICE O/P EST LOW 20 MIN: CPT | Performed by: OBSTETRICS & GYNECOLOGY

## 2022-06-09 NOTE — PROGRESS NOTES
Was supposed to have repeat us with growth  / cl.  ptl precautions. Size greater than dates. Has cerclage with vaginal prog. Nausea better with antacid / antiemetic.  rtc in am as late in day and needs to  daughter.

## 2022-06-10 ENCOUNTER — ROUTINE PRENATAL (OUTPATIENT)
Dept: OBGYN CLINIC | Age: 27
End: 2022-06-10
Payer: COMMERCIAL

## 2022-06-10 VITALS — SYSTOLIC BLOOD PRESSURE: 118 MMHG | DIASTOLIC BLOOD PRESSURE: 72 MMHG

## 2022-06-10 DIAGNOSIS — O34.32 CERVICAL CERCLAGE SUTURE PRESENT IN SECOND TRIMESTER: ICD-10-CM

## 2022-06-10 DIAGNOSIS — O09.212 CURRENT PREGNANCY WITH HISTORY OF PRE-TERM LABOR IN SECOND TRIMESTER: ICD-10-CM

## 2022-06-10 DIAGNOSIS — O36.5931 POOR FETAL GROWTH AFFECTING MANAGEMENT OF MOTHER IN THIRD TRIMESTER, FETUS 1 OF MULTIPLE GESTATION: ICD-10-CM

## 2022-06-10 DIAGNOSIS — O36.5990 POOR FETAL GROWTH AFFECTING MANAGEMENT OF MOTHER, ANTEPARTUM, SINGLE OR UNSPECIFIED FETUS: Primary | ICD-10-CM

## 2022-06-10 DIAGNOSIS — O34.32 CERVICAL INSUFFICIENCY DURING PREGNANCY IN SECOND TRIMESTER, ANTEPARTUM: ICD-10-CM

## 2022-06-10 PROBLEM — O36.5930 POOR FETAL GROWTH AFFECTING MANAGEMENT OF MOTHER IN THIRD TRIMESTER: Status: ACTIVE | Noted: 2022-06-10

## 2022-06-10 PROCEDURE — 99213 OFFICE O/P EST LOW 20 MIN: CPT | Performed by: OBSTETRICS & GYNECOLOGY

## 2022-06-10 PROCEDURE — 76816 OB US FOLLOW-UP PER FETUS: CPT | Performed by: OBSTETRICS & GYNECOLOGY

## 2022-06-10 PROCEDURE — 76817 TRANSVAGINAL US OBSTETRIC: CPT | Performed by: OBSTETRICS & GYNECOLOGY

## 2022-06-10 PROCEDURE — 76819 FETAL BIOPHYS PROFIL W/O NST: CPT | Performed by: OBSTETRICS & GYNECOLOGY

## 2022-06-10 NOTE — PROGRESS NOTES
Growth with ac of 8%. toan 13.6cm. bpp 8/8. Overall efw 37%. 2.5cm shortest length captured. Discussed growth restriction - increasing calories / not being super active. rtc around 32 weeks for twice weekly testing. Increased morbidity  -why will watch twice weekly.

## 2022-06-20 ENCOUNTER — ROUTINE PRENATAL (OUTPATIENT)
Dept: OBGYN CLINIC | Age: 27
End: 2022-06-20
Payer: COMMERCIAL

## 2022-06-20 VITALS — BODY MASS INDEX: 32.17 KG/M2 | WEIGHT: 205.4 LBS | SYSTOLIC BLOOD PRESSURE: 114 MMHG | DIASTOLIC BLOOD PRESSURE: 72 MMHG

## 2022-06-20 DIAGNOSIS — Z13.89 SCREENING FOR GENITOURINARY CONDITION: ICD-10-CM

## 2022-06-20 DIAGNOSIS — O36.5931 POOR FETAL GROWTH AFFECTING MANAGEMENT OF MOTHER IN THIRD TRIMESTER, FETUS 1 OF MULTIPLE GESTATION: Primary | ICD-10-CM

## 2022-06-20 DIAGNOSIS — Z3A.32 32 WEEKS GESTATION OF PREGNANCY: ICD-10-CM

## 2022-06-20 DIAGNOSIS — O34.33 CERVICAL CERCLAGE SUTURE PRESENT IN THIRD TRIMESTER: ICD-10-CM

## 2022-06-20 LAB
GLUCOSE URINE, POC: NEGATIVE
PROTEIN,URINE, POC: NORMAL

## 2022-06-20 PROCEDURE — 76819 FETAL BIOPHYS PROFIL W/O NST: CPT | Performed by: OBSTETRICS & GYNECOLOGY

## 2022-06-20 PROCEDURE — 99213 OFFICE O/P EST LOW 20 MIN: CPT | Performed by: OBSTETRICS & GYNECOLOGY

## 2022-06-23 ENCOUNTER — ROUTINE PRENATAL (OUTPATIENT)
Dept: OBGYN CLINIC | Age: 27
End: 2022-06-23
Payer: COMMERCIAL

## 2022-06-23 VITALS — DIASTOLIC BLOOD PRESSURE: 62 MMHG | BODY MASS INDEX: 32.58 KG/M2 | WEIGHT: 208 LBS | SYSTOLIC BLOOD PRESSURE: 118 MMHG

## 2022-06-23 DIAGNOSIS — Z13.89 SCREENING FOR GENITOURINARY CONDITION: ICD-10-CM

## 2022-06-23 DIAGNOSIS — O09.212 CURRENT PREGNANCY WITH HISTORY OF PRE-TERM LABOR IN SECOND TRIMESTER: ICD-10-CM

## 2022-06-23 DIAGNOSIS — O36.5930 POOR FETAL GROWTH AFFECTING MANAGEMENT OF MOTHER IN THIRD TRIMESTER, SINGLE OR UNSPECIFIED FETUS: Primary | ICD-10-CM

## 2022-06-23 DIAGNOSIS — Z3A.32 32 WEEKS GESTATION OF PREGNANCY: ICD-10-CM

## 2022-06-23 DIAGNOSIS — Z34.82 PRENATAL CARE, SUBSEQUENT PREGNANCY, SECOND TRIMESTER: ICD-10-CM

## 2022-06-23 LAB
GLUCOSE URINE, POC: NEGATIVE
PROTEIN,URINE, POC: NORMAL

## 2022-06-23 PROCEDURE — 99213 OFFICE O/P EST LOW 20 MIN: CPT | Performed by: OBSTETRICS & GYNECOLOGY

## 2022-06-23 PROCEDURE — 76819 FETAL BIOPHYS PROFIL W/O NST: CPT | Performed by: OBSTETRICS & GYNECOLOGY

## 2022-06-29 ENCOUNTER — HOSPITAL ENCOUNTER (EMERGENCY)
Age: 27
Discharge: HOME | End: 2022-06-30
Attending: EMERGENCY MEDICINE | Admitting: EMERGENCY MEDICINE
Payer: COMMERCIAL

## 2022-06-29 ENCOUNTER — HOSPITAL ENCOUNTER (EMERGENCY)
Dept: CT IMAGING | Age: 27
Discharge: HOME | End: 2022-07-02
Payer: COMMERCIAL

## 2022-06-29 ENCOUNTER — ROUTINE PRENATAL (OUTPATIENT)
Dept: OBGYN CLINIC | Age: 27
End: 2022-06-29
Payer: COMMERCIAL

## 2022-06-29 VITALS — SYSTOLIC BLOOD PRESSURE: 110 MMHG | DIASTOLIC BLOOD PRESSURE: 60 MMHG | WEIGHT: 205 LBS | BODY MASS INDEX: 32.11 KG/M2

## 2022-06-29 DIAGNOSIS — R07.89 ATYPICAL CHEST PAIN: ICD-10-CM

## 2022-06-29 DIAGNOSIS — O09.93 HIGH-RISK PREGNANCY IN THIRD TRIMESTER: Primary | ICD-10-CM

## 2022-06-29 DIAGNOSIS — Z13.89 SCREENING FOR GENITOURINARY CONDITION: ICD-10-CM

## 2022-06-29 DIAGNOSIS — U07.1 COVID: Primary | ICD-10-CM

## 2022-06-29 DIAGNOSIS — O36.5930 POOR FETAL GROWTH AFFECTING MANAGEMENT OF MOTHER IN THIRD TRIMESTER, SINGLE OR UNSPECIFIED FETUS: ICD-10-CM

## 2022-06-29 DIAGNOSIS — Z3A.33 33 WEEKS GESTATION OF PREGNANCY: ICD-10-CM

## 2022-06-29 DIAGNOSIS — O34.33 CERVICAL CERCLAGE SUTURE PRESENT IN THIRD TRIMESTER: ICD-10-CM

## 2022-06-29 PROBLEM — M54.2 NECK PAIN: Status: ACTIVE | Noted: 2017-09-14

## 2022-06-29 PROBLEM — R76.8 POSITIVE ANA (ANTINUCLEAR ANTIBODY): Status: ACTIVE | Noted: 2017-09-14

## 2022-06-29 PROBLEM — O09.212 CURRENT PREGNANCY WITH HISTORY OF PRE-TERM LABOR IN SECOND TRIMESTER: Status: ACTIVE | Noted: 2022-01-13

## 2022-06-29 LAB
ALBUMIN SERPL-MCNC: 2.7 G/DL (ref 3.5–5)
ALBUMIN/GLOB SERPL: 0.7 {RATIO} (ref 1.2–3.5)
ALP SERPL-CCNC: 93 U/L (ref 50–130)
ALT SERPL-CCNC: 20 U/L (ref 12–65)
ANION GAP SERPL CALC-SCNC: 9 MMOL/L (ref 7–16)
AST SERPL-CCNC: 23 U/L (ref 15–37)
BILIRUB SERPL-MCNC: 0.2 MG/DL (ref 0.2–1.1)
BUN SERPL-MCNC: 8 MG/DL (ref 6–23)
CALCIUM SERPL-MCNC: 8.7 MG/DL (ref 8.3–10.4)
CHLORIDE SERPL-SCNC: 106 MMOL/L (ref 98–107)
CO2 SERPL-SCNC: 22 MMOL/L (ref 21–32)
CREAT SERPL-MCNC: 0.53 MG/DL (ref 0.6–1)
D DIMER PPP FEU-MCNC: 0.74 UG/ML(FEU)
ERYTHROCYTE [DISTWIDTH] IN BLOOD BY AUTOMATED COUNT: 12.8 % (ref 11.9–14.6)
GLOBULIN SER CALC-MCNC: 3.9 G/DL (ref 2.3–3.5)
GLUCOSE SERPL-MCNC: 97 MG/DL (ref 65–100)
GLUCOSE URINE, POC: NEGATIVE
HCT VFR BLD AUTO: 36.4 % (ref 35.8–46.3)
HGB BLD-MCNC: 12.5 G/DL (ref 11.7–15.4)
MCH RBC QN AUTO: 30.9 PG (ref 26.1–32.9)
MCHC RBC AUTO-ENTMCNC: 34.3 G/DL (ref 31.4–35)
MCV RBC AUTO: 90.1 FL (ref 79.6–97.8)
NRBC # BLD: 0 K/UL (ref 0–0.2)
PLATELET # BLD AUTO: 139 K/UL (ref 150–450)
PMV BLD AUTO: 12.2 FL (ref 9.4–12.3)
POTASSIUM SERPL-SCNC: 3.8 MMOL/L (ref 3.5–5.1)
PROT SERPL-MCNC: 6.6 G/DL (ref 6.3–8.2)
PROTEIN,URINE, POC: NORMAL
RBC # BLD AUTO: 4.04 M/UL (ref 4.05–5.2)
SARS-COV-2 RDRP RESP QL NAA+PROBE: DETECTED
SODIUM SERPL-SCNC: 137 MMOL/L (ref 136–145)
SOURCE: ABNORMAL
TROPONIN I SERPL HS-MCNC: 4.1 PG/ML (ref 0–14)
WBC # BLD AUTO: 9.5 K/UL (ref 4.3–11.1)

## 2022-06-29 PROCEDURE — 99284 EMERGENCY DEPT VISIT MOD MDM: CPT

## 2022-06-29 PROCEDURE — 80053 COMPREHEN METABOLIC PANEL: CPT

## 2022-06-29 PROCEDURE — 2580000003 HC RX 258: Performed by: PHYSICIAN ASSISTANT

## 2022-06-29 PROCEDURE — 87635 SARS-COV-2 COVID-19 AMP PRB: CPT

## 2022-06-29 PROCEDURE — 85379 FIBRIN DEGRADATION QUANT: CPT

## 2022-06-29 PROCEDURE — 84484 ASSAY OF TROPONIN QUANT: CPT

## 2022-06-29 PROCEDURE — 71260 CT THORAX DX C+: CPT

## 2022-06-29 PROCEDURE — 93005 ELECTROCARDIOGRAM TRACING: CPT | Performed by: EMERGENCY MEDICINE

## 2022-06-29 PROCEDURE — 76819 FETAL BIOPHYS PROFIL W/O NST: CPT | Performed by: OBSTETRICS & GYNECOLOGY

## 2022-06-29 PROCEDURE — 99213 OFFICE O/P EST LOW 20 MIN: CPT | Performed by: OBSTETRICS & GYNECOLOGY

## 2022-06-29 PROCEDURE — 85027 COMPLETE CBC AUTOMATED: CPT

## 2022-06-29 PROCEDURE — 6360000004 HC RX CONTRAST MEDICATION: Performed by: PHYSICIAN ASSISTANT

## 2022-06-29 RX ORDER — SODIUM CHLORIDE 0.9 % (FLUSH) 0.9 %
10 SYRINGE (ML) INJECTION
Status: COMPLETED | OUTPATIENT
Start: 2022-06-29 | End: 2022-06-29

## 2022-06-29 RX ORDER — SODIUM CHLORIDE 0.9 % (FLUSH) 0.9 %
3 SYRINGE (ML) INJECTION EVERY 8 HOURS
Status: DISCONTINUED | OUTPATIENT
Start: 2022-06-29 | End: 2022-06-30 | Stop reason: HOSPADM

## 2022-06-29 RX ORDER — 0.9 % SODIUM CHLORIDE 0.9 %
1000 INTRAVENOUS SOLUTION INTRAVENOUS ONCE
Status: COMPLETED | OUTPATIENT
Start: 2022-06-29 | End: 2022-06-30

## 2022-06-29 RX ORDER — 0.9 % SODIUM CHLORIDE 0.9 %
100 INTRAVENOUS SOLUTION INTRAVENOUS ONCE
Status: COMPLETED | OUTPATIENT
Start: 2022-06-29 | End: 2022-06-29

## 2022-06-29 RX ADMIN — IOPAMIDOL 100 ML: 755 INJECTION, SOLUTION INTRAVENOUS at 23:17

## 2022-06-29 RX ADMIN — SODIUM CHLORIDE 1000 ML: 9 INJECTION, SOLUTION INTRAVENOUS at 21:32

## 2022-06-29 RX ADMIN — SODIUM CHLORIDE, PRESERVATIVE FREE 10 ML: 5 INJECTION INTRAVENOUS at 23:18

## 2022-06-29 RX ADMIN — SODIUM CHLORIDE 100 ML: 9 INJECTION, SOLUTION INTRAVENOUS at 23:18

## 2022-06-29 ASSESSMENT — PAIN DESCRIPTION - PAIN TYPE: TYPE: ACUTE PAIN

## 2022-06-29 ASSESSMENT — PAIN SCALES - GENERAL: PAINLEVEL_OUTOF10: 7

## 2022-06-29 ASSESSMENT — ENCOUNTER SYMPTOMS
COUGH: 1
GASTROINTESTINAL NEGATIVE: 1
CHEST TIGHTNESS: 1

## 2022-06-29 ASSESSMENT — PAIN DESCRIPTION - FREQUENCY: FREQUENCY: INTERMITTENT

## 2022-06-29 ASSESSMENT — LIFESTYLE VARIABLES
HOW OFTEN DO YOU HAVE A DRINK CONTAINING ALCOHOL: MONTHLY OR LESS
HOW OFTEN DO YOU HAVE A DRINK CONTAINING ALCOHOL: NEVER

## 2022-06-29 ASSESSMENT — PAIN DESCRIPTION - LOCATION: LOCATION: CHEST

## 2022-06-29 ASSESSMENT — PAIN - FUNCTIONAL ASSESSMENT: PAIN_FUNCTIONAL_ASSESSMENT: 0-10

## 2022-06-29 ASSESSMENT — PAIN DESCRIPTION - DESCRIPTORS: DESCRIPTORS: ACHING

## 2022-06-30 VITALS
OXYGEN SATURATION: 99 % | HEART RATE: 93 BPM | HEIGHT: 67 IN | DIASTOLIC BLOOD PRESSURE: 89 MMHG | BODY MASS INDEX: 32.18 KG/M2 | WEIGHT: 205 LBS | RESPIRATION RATE: 21 BRPM | TEMPERATURE: 99.3 F | SYSTOLIC BLOOD PRESSURE: 147 MMHG

## 2022-06-30 LAB
EKG ATRIAL RATE: 115 BPM
EKG DIAGNOSIS: NORMAL
EKG P AXIS: 75 DEGREES
EKG P-R INTERVAL: 134 MS
EKG Q-T INTERVAL: 310 MS
EKG QRS DURATION: 84 MS
EKG QTC CALCULATION (BAZETT): 428 MS
EKG R AXIS: 80 DEGREES
EKG T AXIS: 35 DEGREES
EKG VENTRICULAR RATE: 115 BPM

## 2022-06-30 NOTE — ED NOTES
I have reviewed discharge instructions with the patient. The patient verbalized understanding. Patient left ED via Discharge Method: ambulatory to Home with self. Opportunity for questions and clarification provided. Patient given 0 scripts. To continue your aftercare when you leave the hospital, you may receive an automated call from our care team to check in on how you are doing. This is a free service and part of our promise to provide the best care and service to meet your aftercare needs.  If you have questions, or wish to unsubscribe from this service please call [phone]. Thank you for Choosing our [facility] Emergency Department.       IRMA Hodgson  06/30/22 0696

## 2022-06-30 NOTE — ED PROVIDER NOTES
[employer] Emergency Department Provider Note                   PCP:                Magdi Mahoney APRN - CNP               Age: 33 y.o. Sex: female       ICD-10-CM    1. COVID  U07.1    2. Atypical chest pain  R07.89        DISPOSITION Decision To Discharge 06/30/2022 12:03:30 AM       New Prescriptions    No medications on file       Orders Placed This Encounter   Procedures    COVID-19, Rapid    CT CHEST PULMONARY EMBOLISM W CONTRAST    Troponin    CBC    Comprehensive Metabolic Panel    D-Dimer, Quantitative    Cardiac Monitor    Pulse Oximetry    FETAL HEART RATE    EKG 12 Lead    Saline lock IV        AMANDA Loomis 12:04 AM      MDM  Number of Diagnoses or Management Options  Atypical chest pain  COVID  Diagnosis management comments: Patient is a 78-year-old female 33 weeks pregnant. Presented with chest pain. Found to be COVID-positive. Positive D-dimer. CTA negative for PE. Chest pain resolved at this time. I did speak to the [facility] hospitalist who did not have any specific recommendations for COVID treatment. Patient wants to not take any antivirals or other meds at this time. I did recommend she take Tylenol if she develops fever. Very strict return precautions given. She is to call her OB/GYN first in the morning and return if worse in any way. Risk of Complications, Morbidity, and/or Mortality  Presenting problems: moderate  Diagnostic procedures: moderate  Management options: moderate    Patient Progress  Patient progress: resolved       Shanti Chaudhry is a 33 y.o. female who presents to the Emergency Department with chief complaint of    Chief Complaint   Patient presents with    Chest Pain      Patient is a 78-year-old female G2, P1 approximately 33 weeks pregnant who presents with right-sided chest pain that began today. She says pain starts in the middle and shoots to the right. She has had cough and congestion for 2 days.   She believes she has had a COVID contact. No fever. No abdominal pain vaginal bleeding or leaking fluid. Good fetal movement. No dysuria. She is currently taking prenatals. No history of preeclampsia. Currently has a cerclage in. She states she saw her OB/GYN this morning and everything was fine. Was not having chest pain at that time. Review of Systems   Constitutional: Negative. HENT: Positive for congestion. Respiratory: Positive for cough and chest tightness. Cardiovascular: Positive for chest pain. Negative for palpitations and leg swelling. Gastrointestinal: Negative. Genitourinary: Negative. All other systems reviewed and are negative. Past Medical History:   Diagnosis Date    Anxiety     Chlamydia     Depression     medication 08/2017-Decemeber 2017. doing well off meds    Headache     Postpartum depression     Scoliosis         History reviewed. No pertinent surgical history. Family History   Problem Relation Age of Onset    Hypertension Mother     Other Cousin         polydactyl    Bipolar Disorder Father     Cancer Maternal Grandfather         bone    Cancer Paternal Grandmother         cervical           Social Connections:     Frequency of Communication with Friends and Family: Not on file    Frequency of Social Gatherings with Friends and Family: Not on file    Attends Sikh Services: Not on file    Active Member of Clubs or Organizations: Not on file    Attends Club or Organization Meetings: Not on file    Marital Status: Not on file        No Known Allergies     Vitals signs and nursing note reviewed.    Patient Vitals for the past 4 hrs:   Temp Pulse Resp BP SpO2   06/29/22 2300 -- 93 29 (!) 122/58 99 %   06/29/22 2230 -- 99 19 120/67 --   06/29/22 2200 -- (!) 105 (!) 32 (!) 117/56 --   06/29/22 2130 -- (!) 107 14 134/60 99 %   06/29/22 2115 -- (!) 104 26 134/67 99 %   06/29/22 2111 -- (!) 102 26 -- 99 %   06/29/22 2108 -- -- -- -- 100 %   06/29/22 2016 99.3 °F (37.4 °C) (!) 118 20 125/77 --          Physical Exam  Vitals and nursing note reviewed. Constitutional:       General: She is not in acute distress. Appearance: She is well-developed and normal weight. She is not ill-appearing or toxic-appearing. HENT:      Head: Normocephalic. Right Ear: Tympanic membrane normal.      Left Ear: Tympanic membrane normal.      Nose: No congestion. Mouth/Throat:      Mouth: Mucous membranes are moist.      Pharynx: No oropharyngeal exudate or posterior oropharyngeal erythema. Eyes:      Extraocular Movements: Extraocular movements intact. Pupils: Pupils are equal, round, and reactive to light. Cardiovascular:      Rate and Rhythm: Normal rate and regular rhythm. Pulses: Normal pulses. Heart sounds: Normal heart sounds. Pulmonary:      Effort: Pulmonary effort is normal.      Breath sounds: Normal breath sounds. Abdominal:      Palpations: Abdomen is soft. Tenderness: There is no abdominal tenderness. Comments: Gravid uterus   Musculoskeletal:         General: No tenderness. Normal range of motion. Cervical back: Normal range of motion and neck supple. Right lower leg: No edema. Left lower leg: No edema. Lymphadenopathy:      Cervical: No cervical adenopathy. Skin:     General: Skin is warm and dry. Findings: No rash. Neurological:      General: No focal deficit present. Mental Status: She is alert. Mental status is at baseline. Psychiatric:         Mood and Affect: Mood normal.         Behavior: Behavior normal.         Thought Content:  Thought content normal.          Procedures      Labs Reviewed   COVID-19, RAPID - Abnormal; Notable for the following components:       Result Value    SARS-CoV-2, Rapid Detected (*)     All other components within normal limits   CBC - Abnormal; Notable for the following components:    RBC 4.04 (*)     Platelets 698 (*)     All other components within normal limits COMPREHENSIVE METABOLIC PANEL - Abnormal; Notable for the following components:    CREATININE 0.53 (*)     Albumin 2.7 (*)     Globulin 3.9 (*)     Albumin/Globulin Ratio 0.7 (*)     All other components within normal limits   D-DIMER, QUANTITATIVE - Abnormal; Notable for the following components:    D-Dimer, Quant 0.74 (*)     All other components within normal limits   TROPONIN   TROPONIN        CT CHEST PULMONARY EMBOLISM W CONTRAST   Final Result   No acute abnormality. ED Course as of 06/30/22 0004   Wed Jun 29, 2022   2200 EKG 12 Lead  EKG shows sinus tachycardia rate of 115. Normal axis. No acute ischemic changes. No STEMI. [RODRIGO]      ED Course User Index  [RODRIGO] AMANDA Gooden        Voice dictation software was used during the making of this note. This software is not perfect and grammatical and other typographical errors may be present. This note has not been completely proofread for errors.        Prabhakar Gooden  06/30/22 0004

## 2022-07-01 ENCOUNTER — HOSPITAL ENCOUNTER (OUTPATIENT)
Age: 27
Discharge: HOME | End: 2022-07-01
Attending: OBSTETRICS & GYNECOLOGY | Admitting: OBSTETRICS & GYNECOLOGY
Payer: COMMERCIAL

## 2022-07-01 ENCOUNTER — CARE COORDINATION (OUTPATIENT)
Dept: CARE COORDINATION | Facility: CLINIC | Age: 27
End: 2022-07-01

## 2022-07-01 VITALS
RESPIRATION RATE: 18 BRPM | DIASTOLIC BLOOD PRESSURE: 62 MMHG | SYSTOLIC BLOOD PRESSURE: 110 MMHG | HEART RATE: 100 BPM | TEMPERATURE: 98.1 F | OXYGEN SATURATION: 99 %

## 2022-07-01 PROCEDURE — 99282 EMERGENCY DEPT VISIT SF MDM: CPT

## 2022-07-01 PROCEDURE — 59025 FETAL NON-STRESS TEST: CPT

## 2022-07-01 RX ORDER — FAMOTIDINE 10 MG
10 TABLET ORAL 2 TIMES DAILY
COMMUNITY
End: 2022-07-29

## 2022-07-01 NOTE — CARE COORDINATION
Patient contacted regarding COVID-19 diagnosis. Discussed COVID-19 related testing which was available at this time. Test results were positive. Patient informed of results, if available? Yes. LPN Care Coordinator contacted the patient by telephone to perform post discharge assessment. Call within 2 business days of discharge: Yes. Verified name and  with patient as identifiers. Provided introduction to self, and explanation of the LPN CC role, and reason for call due to risk factors for infection and/or exposure to COVID-19. Symptoms reviewed with patient who verbalized the following symptoms: fatigue  pain or aching joints  cough  chills or shaking  sweating  no new symptoms  no worsening symptoms. Due to no new or worsening symptoms encounter was not routed to provider for escalation. Discussed follow-up appointments. If no appointment was previously scheduled, appointment scheduling offered: Yes. [facility] follow up appointment(s):   Future Appointments   Date Time Provider Pal Khan   2022  2:30 PM [facility] US 2 VD uprosy LEDEZMA AMB   2022  3:30 PM MD jean [facility]   2022 10:00 AM [facility] OB OUTPATIENT SFELDPROC [facility]         Non-face-to-face services provided:  Scheduled appointment with PCP-Patient declined assistance with scheduling   Scheduled appointment with Specialist-Previously scheduled; see above. Assistance with rescheduling for earlier date was declined  Obtained and reviewed discharge summary and/or continuity of care documents  Education of patient/family/caregiver/guardian to support self-management-Patient verbalized understanding      Advance Care Planning:   Does patient have an Advance Directive:  not on file. Educated patient about risk for severe COVID-19 due to risk factors according to CDC guidelines. LPN CC reviewed discharge instructions, medical action plan and red flag symptoms with the patient who verbalized understanding.  Discussed COVID vaccination status: Yes. Education provided on COVID-19 vaccination as appropriate. Discussed exposure protocols and quarantine with CDC Guidelines. Patient was given an opportunity to verbalize any questions and concerns and agrees to contact LPN CC or health care provider for questions related to their healthcare. Reviewed and educated patient on any new and changed medications related to discharge diagnosis     Was patient discharged with a pulse oximeter? no      LPN CC provided contact information. Plan for follow-up call in 5-7 days based on severity of symptoms and risk factors.

## 2022-07-02 NOTE — H&P
History & Physical    Name: Areli Irby MRN: [id number]  SSN: [id number]    YOB: 1995  Age: 33 y.o. Sex: female      Subjective:     Reason for Triage visit:  Pregnancy and decreased FM    History of Present Illness: Ms. Ysabel Womack is a 33 y.o.  female with an estimated gestational age of 32w6d with Estimated Date of Delivery: 22. Patient complains of decreased fetal movement for 1 days. Patient denies any and all symptoms excpet decreased FM. + covid dx but no SOB, CP or any covid related concerns. .    OB History    Para Term  AB Living   2 1 1 0 0 1   SAB IAB Ectopic Molar Multiple Live Births             1      # Outcome Date GA Lbr Zak/2nd Weight Sex Delivery Anes PTL Lv   2 Current            1 Term 19 37w1d 13:48 / 01:19 6 lb 8.6 oz (2.965 kg) M Vag-Spont EPI N VIKA     Past Medical History:   Diagnosis Date    Anxiety     Chlamydia     Depression     medication 2017-2017. doing well off meds    Headache     Postpartum depression     Scoliosis      No past surgical history on file. Social History     Occupational History    Not on file   Tobacco Use    Smoking status: Former Smoker     Packs/day: 0.25    Smokeless tobacco: Never Used    Tobacco comment: Quit smoking: none since +pt   Substance and Sexual Activity    Alcohol use: No    Drug use: Not Currently    Sexual activity: Not on file      Family History   Problem Relation Age of Onset    Hypertension Mother     Other Cousin         polydactyl    Bipolar Disorder Father     Cancer Maternal Grandfather         bone    Cancer Paternal Grandmother         cervical       No Known Allergies  Prior to Admission medications    Medication Sig Start Date End Date Taking?  Authorizing Provider   famotidine (PEPCID) 10 MG tablet Take 10 mg by mouth 2 times daily   Yes Historical Provider, MD   ondansetron (ZOFRAN) 4 MG tablet Take 1 tablet by mouth 3 times daily as needed for Nausea or Vomiting  Patient not taking: Reported on 2022   Ibeth Gutierrez MD   Prenatal Vit w/Wz-Trjqnhqnc-XA (PNV PO) Take by mouth    Historical Provider, MD   acetaminophen (TYLENOL) 325 MG tablet Take 1,000 mg by mouth every 4 hours as needed  Patient not taking: Reported on 2022    Ar Automatic Reconciliation   calcium carbonate (OS-JORGE) 1250 (500 Ca) MG chewable tablet Take 1 tablet by mouth daily  Patient not taking: Reported on 2022    Ar Automatic Reconciliation   progesterone (PROMETRIUM) 200 MG CAPS capsule Insert capsule nightly into vagina until 36 weeks  Patient not taking: Reported on 2022   Ar Automatic Reconciliation        Review of Systems:       Objective:     Vitals:    Vitals:    223 22   BP: 110/62    Pulse: 100    Resp:  18   Temp:  98.1 °F (36.7 °C)   TempSrc:  Oral   SpO2:  99%      Temp (24hrs), Av.1 °F (36.7 °C), Min:98.1 °F (36.7 °C), Max:98.1 °F (36.7 °C)    BP  Min: 110/62  Max: 110/62     Physical Exam:    Fetal Heart Rate:  Reactive tracing. No contractions       Lab/Data Review:  No results found for this or any previous visit (from the past 24 hour(s)). Assessment and Plan:   Initially with perceived decreased FM. On arrival, starting moving much better. No longer an issue. Reactive NST. Labor precautions given. Patient was told to return to LD immediately if she experiences contractions in a pattern, loss of fluids, vaginal bleeding or decreased Fetal movement.

## 2022-07-02 NOTE — PROGRESS NOTES
Patient presents to [facility] with complaint of decreased fetal movement today that resolved when placed on fetal monitor.

## 2022-07-02 NOTE — PROGRESS NOTES
Patient discharged home self care. Patient verbalizes understanding of kick count and other discharge instructions. Patient ambulated out.

## 2022-07-05 ENCOUNTER — ROUTINE PRENATAL (OUTPATIENT)
Dept: OBGYN CLINIC | Age: 27
End: 2022-07-05
Payer: COMMERCIAL

## 2022-07-05 VITALS — SYSTOLIC BLOOD PRESSURE: 116 MMHG | WEIGHT: 205 LBS | DIASTOLIC BLOOD PRESSURE: 74 MMHG | BODY MASS INDEX: 32.11 KG/M2

## 2022-07-05 DIAGNOSIS — Z3A.34 34 WEEKS GESTATION OF PREGNANCY: Primary | ICD-10-CM

## 2022-07-05 DIAGNOSIS — Z34.83 PRENATAL CARE, SUBSEQUENT PREGNANCY, THIRD TRIMESTER: ICD-10-CM

## 2022-07-05 DIAGNOSIS — O36.5930 POOR FETAL GROWTH AFFECTING MANAGEMENT OF MOTHER IN THIRD TRIMESTER, SINGLE OR UNSPECIFIED FETUS: ICD-10-CM

## 2022-07-05 DIAGNOSIS — Z13.89 SCREENING FOR GENITOURINARY CONDITION: ICD-10-CM

## 2022-07-05 LAB
GLUCOSE URINE, POC: NEGATIVE
PROTEIN,URINE, POC: NORMAL

## 2022-07-05 PROCEDURE — 99213 OFFICE O/P EST LOW 20 MIN: CPT | Performed by: OBSTETRICS & GYNECOLOGY

## 2022-07-05 PROCEDURE — 76819 FETAL BIOPHYS PROFIL W/O NST: CPT | Performed by: OBSTETRICS & GYNECOLOGY

## 2022-07-05 NOTE — PROGRESS NOTES
AOK.  Reassured regarding concerns  Discussed cerclage removal at 37 weeks. Continue 2x weekly BPP until delivery.

## 2022-07-07 ENCOUNTER — CARE COORDINATION (OUTPATIENT)
Dept: CARE COORDINATION | Facility: CLINIC | Age: 27
End: 2022-07-07

## 2022-07-07 NOTE — CARE COORDINATION
You Patient resolved from the Care Transitions episode on 7/7/22  Discussed COVID-19 related testing which was available at this time. Test results were positive. Patient informed of results, if available? Yes    Patient/family has been provided the following resources and education related to COVID-19:                         Signs, symptoms and red flags related to COVID-19            CDC exposure and quarantine guidelines            Conduit exposure contact - [phone]            Contact for their local Department of Health                 Patient currently reports that the following symptoms have improved:  fatigue, pain or aching joints, cough, chills or shaking, sweating and no new/worsening symptoms     No further outreach scheduled with this LPN CC. Episode of Care resolved. Patient has this LPN CC contact information if future needs arise.

## 2022-07-08 ENCOUNTER — ROUTINE PRENATAL (OUTPATIENT)
Dept: OBGYN CLINIC | Age: 27
End: 2022-07-08
Payer: COMMERCIAL

## 2022-07-08 VITALS — WEIGHT: 204 LBS | BODY MASS INDEX: 31.95 KG/M2 | DIASTOLIC BLOOD PRESSURE: 60 MMHG | SYSTOLIC BLOOD PRESSURE: 110 MMHG

## 2022-07-08 DIAGNOSIS — O09.93 HIGH-RISK PREGNANCY IN THIRD TRIMESTER: Primary | ICD-10-CM

## 2022-07-08 DIAGNOSIS — O34.33 CERVICAL INSUFFICIENCY DURING PREGNANCY IN THIRD TRIMESTER, ANTEPARTUM: ICD-10-CM

## 2022-07-08 DIAGNOSIS — Z13.89 SCREENING FOR GENITOURINARY CONDITION: ICD-10-CM

## 2022-07-08 DIAGNOSIS — Z86.2 HISTORY OF THROMBOCYTOPENIA: ICD-10-CM

## 2022-07-08 DIAGNOSIS — Z3A.34 34 WEEKS GESTATION OF PREGNANCY: ICD-10-CM

## 2022-07-08 DIAGNOSIS — O36.5931 POOR FETAL GROWTH AFFECTING MANAGEMENT OF MOTHER IN THIRD TRIMESTER, FETUS 1 OF MULTIPLE GESTATION: ICD-10-CM

## 2022-07-08 LAB
GLUCOSE URINE, POC: NEGATIVE
PROTEIN,URINE, POC: NORMAL

## 2022-07-08 PROCEDURE — 99213 OFFICE O/P EST LOW 20 MIN: CPT | Performed by: OBSTETRICS & GYNECOLOGY

## 2022-07-08 NOTE — PROGRESS NOTES
Hx of cerclage. Iugr. Not seeing mfm. Due for growth. Will do growth on Tuesday with bpp /toan.  nst reactive. Had had nausea past couple of weeks. Sac diet. Is taking pepcid and has zofran. Kick counts and ptl precautions.

## 2022-07-14 ENCOUNTER — ROUTINE PRENATAL (OUTPATIENT)
Dept: OBGYN CLINIC | Age: 27
End: 2022-07-14
Payer: COMMERCIAL

## 2022-07-14 VITALS — DIASTOLIC BLOOD PRESSURE: 60 MMHG | BODY MASS INDEX: 32.61 KG/M2 | SYSTOLIC BLOOD PRESSURE: 122 MMHG | WEIGHT: 208.2 LBS

## 2022-07-14 DIAGNOSIS — O09.93 HIGH-RISK PREGNANCY IN THIRD TRIMESTER: ICD-10-CM

## 2022-07-14 DIAGNOSIS — Z86.2 HISTORY OF THROMBOCYTOPENIA: ICD-10-CM

## 2022-07-14 DIAGNOSIS — Z3A.35 35 WEEKS GESTATION OF PREGNANCY: ICD-10-CM

## 2022-07-14 DIAGNOSIS — O36.5931 POOR FETAL GROWTH AFFECTING MANAGEMENT OF MOTHER IN THIRD TRIMESTER, FETUS 1 OF MULTIPLE GESTATION: ICD-10-CM

## 2022-07-14 DIAGNOSIS — Z34.83 PRENATAL CARE, SUBSEQUENT PREGNANCY, THIRD TRIMESTER: Primary | ICD-10-CM

## 2022-07-14 DIAGNOSIS — Z13.89 SCREENING FOR GENITOURINARY CONDITION: ICD-10-CM

## 2022-07-14 LAB
GLUCOSE URINE, POC: NEGATIVE
PROTEIN,URINE, POC: NORMAL

## 2022-07-14 PROCEDURE — 76819 FETAL BIOPHYS PROFIL W/O NST: CPT | Performed by: OBSTETRICS & GYNECOLOGY

## 2022-07-14 PROCEDURE — 99214 OFFICE O/P EST MOD 30 MIN: CPT | Performed by: OBSTETRICS & GYNECOLOGY

## 2022-07-14 PROCEDURE — 76816 OB US FOLLOW-UP PER FETUS: CPT | Performed by: OBSTETRICS & GYNECOLOGY

## 2022-07-18 ENCOUNTER — ROUTINE PRENATAL (OUTPATIENT)
Dept: OBGYN CLINIC | Age: 27
End: 2022-07-18
Payer: COMMERCIAL

## 2022-07-18 VITALS — SYSTOLIC BLOOD PRESSURE: 122 MMHG | DIASTOLIC BLOOD PRESSURE: 70 MMHG | WEIGHT: 210.4 LBS | BODY MASS INDEX: 32.95 KG/M2

## 2022-07-18 DIAGNOSIS — O36.5930 POOR FETAL GROWTH AFFECTING MANAGEMENT OF MOTHER IN THIRD TRIMESTER, SINGLE OR UNSPECIFIED FETUS: ICD-10-CM

## 2022-07-18 DIAGNOSIS — Z13.89 SCREENING FOR GENITOURINARY CONDITION: ICD-10-CM

## 2022-07-18 DIAGNOSIS — Z36.85 ANTENATAL SCREENING FOR STREPTOCOCCUS B: ICD-10-CM

## 2022-07-18 DIAGNOSIS — O09.93 HIGH-RISK PREGNANCY IN THIRD TRIMESTER: Primary | ICD-10-CM

## 2022-07-18 DIAGNOSIS — Z3A.36 36 WEEKS GESTATION OF PREGNANCY: ICD-10-CM

## 2022-07-18 LAB
GLUCOSE URINE, POC: NEGATIVE
PROTEIN,URINE, POC: NORMAL

## 2022-07-18 PROCEDURE — 76819 FETAL BIOPHYS PROFIL W/O NST: CPT | Performed by: OBSTETRICS & GYNECOLOGY

## 2022-07-18 PROCEDURE — 99214 OFFICE O/P EST MOD 30 MIN: CPT | Performed by: OBSTETRICS & GYNECOLOGY

## 2022-07-18 NOTE — PROGRESS NOTES
BPP 8/8 GEE 16  1- cervical incompetence: removal next week, no issues today, no contractions or bleeding; labor precautions given  GBS done  2- IUGR stable

## 2022-07-20 ENCOUNTER — TELEPHONE (OUTPATIENT)
Dept: OBGYN CLINIC | Age: 27
End: 2022-07-20

## 2022-07-20 NOTE — TELEPHONE ENCOUNTER
Patient called and states she dropped paperwork off at  last week for her fiance. She states this has not been sent to his employer.

## 2022-07-22 LAB
BACTERIA SPEC CULT: NORMAL
SERVICE CMNT-IMP: NORMAL

## 2022-07-25 ENCOUNTER — APPOINTMENT (OUTPATIENT)
Dept: LABOR AND DELIVERY | Age: 27
End: 2022-07-25
Payer: COMMERCIAL

## 2022-07-25 ENCOUNTER — HOSPITAL ENCOUNTER (OUTPATIENT)
Age: 27
Discharge: HOME | End: 2022-07-25
Attending: OBSTETRICS & GYNECOLOGY | Admitting: OBSTETRICS & GYNECOLOGY
Payer: COMMERCIAL

## 2022-07-25 VITALS
HEART RATE: 86 BPM | SYSTOLIC BLOOD PRESSURE: 120 MMHG | RESPIRATION RATE: 18 BRPM | TEMPERATURE: 98.4 F | DIASTOLIC BLOOD PRESSURE: 72 MMHG

## 2022-07-25 PROCEDURE — 99282 EMERGENCY DEPT VISIT SF MDM: CPT

## 2022-07-25 PROCEDURE — 0UCC7ZZ EXTIRPATION OF MATTER FROM CERVIX, VIA NATURAL OR ARTIFICIAL OPENING: ICD-10-PCS | Performed by: OBSTETRICS & GYNECOLOGY

## 2022-07-25 PROCEDURE — 59025 FETAL NON-STRESS TEST: CPT

## 2022-07-25 NOTE — DISCHARGE INSTRUCTIONS
alcohol or use marijuana or illegal drugs. Follow your doctor's directions about activity. Your doctor will let you know how much, if any, exercise you can do. Ask your doctor if you can have sex. If you are at risk for early labor, your doctor may ask you to not have sex. Take care to prevent falls. During pregnancy, your joints are loose, and your balance is off. Sports such as bicycling, skiing, or in-line skating can increase your risk of falling. And don't ride horses or motorcycles, dive, water ski, scuba dive, or parachute jump while you are pregnant. Avoid things that can make your body too hot and may be harmful to your baby, such as a hot tub or sauna. Or talk with your doctor before doing anything that raises your body temperature. Your doctor can tell you if it's safe. Do not take any over-the-counter or herbal medicines or supplements without talking to your doctor or pharmacist first.  When should you call for help? Call 911  anytime you think you may need emergency care. For example, call if:    You passed out (lost consciousness). You have a seizure. You have severe vaginal bleeding. You have severe pain in your belly or pelvis. You have had fluid gushing or leaking from your vagina and you know or think the umbilical cord is bulging into your vagina. If this happens, immediately get down on your knees so your rear end (buttocks) is higher than your head. This will decrease the pressure on the cord until help arrives. Call your doctor now or seek immediate medical care if:    You have signs of preeclampsia, such as:  Sudden swelling of your face, hands, or feet. New vision problems (such as dimness, blurring, or seeing spots). A severe headache. You have any vaginal bleeding. You have belly pain or cramping. You have a fever. You have had regular contractions (with or without pain) for an hour.  This means that you have 8 or more within 1 hour or 4 or more in 20 minutes after you change your position and drink fluids. You have a sudden release of fluid from your vagina. You have low back pain or pelvic pressure that does not go away. You notice that your baby has stopped moving or is moving much less than normal.   Watch closely for changes in your health, and be sure to contact your doctor ifyou have any problems. Where can you learn more? Go to https://Embuepepiceweb.health121nexus. org and sign in to your [employer] account. Enter [id number] in the [employer] box to learn more about \"Pregnancy Precautions: Care Instructions. \"     If you do not have an account, please click on the \"Sign Up Now\" link. Current as of: February 23, 2022               Content Version: 13.3  © 2006-2022 [employer]. Care instructions adapted under license by [email or web address] ([facility]). If you have questions about a medical condition or this instruction, always ask your healthcare professional. [place] [address] any warranty or liability for your use of this information. Counting Your Baby's Kicks: Care Instructions  Overview     Counting your baby's kicks is one way your doctor can tell that your baby is healthy. Most women--especially in a first pregnancy--feel their baby move for the first time between 16 and 22 weeks. The movement may feel like flutters rather than kicks. Your baby may move more at certain times of the day. When you are active, you may notice less kicking than when you are resting. At yourprenatal visits, your doctor will ask whether the baby is active. In your last trimester, your doctor may ask you to count the number of timesyou feel your baby move. Follow-up care is a key part of your treatment and safety. Be sure to make and go to all appointments, and call your doctor if you are having problems. It's also a good idea to know your test results and keep alist of the medicines you take. How do you count fetal kicks?   A common method of checking your baby's movement is to note the length of time it takes to count ten movements (such as kicks, flutters, or rolls). Pick your baby's most active time of day to count. This may be any time from morning to evening. If you don't feel 10 movements in an hour, have something to eat or drink and count for another hour. If you don't feel at least 10 movements in the 2-hour period, call your doctor. When should you call for help? Call your doctor now or seek immediate medical care if:    You noticed that your baby has stopped moving or is moving much less than normal.   Watch closely for changes in your health, and be sure to contact your doctor ifyou have any problems. Where can you learn more? Go to https://DataProm.Potential. org and sign in to your [employer] account. Enter V218 in the [employer] box to learn more about \"Counting Your Baby's Kicks: Care Instructions. \"     If you do not have an account, please click on the \"Sign Up Now\" link. Current as of: February 23, 2022               Content Version: 13.3  © 1565-0077 [employer]wise[employer] Incorporated. Care instructions adapted under license by [facility] ([facility]). If you have questions about a medical condition or this instruction, always ask your healthcare professional. [address] any warranty or liability for your use of this information.

## 2022-07-25 NOTE — PROCEDURES
Cerclage removal    Procedure: Cerclage removal  Pre op Diagnosis: Cerclage placed by Dr. Francisco Reeves, 12 marv linn  Type of Cerclage: Ramon  Surgeon: Royal  EBL: 5cc  Anesthesia: None    Procedure: Patient placed in dorsal lithotomy in stirrups. Very poorly tolerated speculum exam and any manipulation of speculum or pelvic exam. Squeezing her legs during evaluation. Cerclage tail noted at 12 o'clock. Grasped with ring forcep. \"v\" noted under knot and attempted to cut one side of v but both sides were cut. Entire cerclage not removed but entire knot surely removed and \" v\" under it noted. Feel there may remain a small piece of mersilene band  that will be able to removed and that can easily dilate along with cervuix as it is no longer tied at top. Encouraged patient to allow me additional attempts but she could not tolerate. Knot shown to patient and nurse as confirmation of removal.  Cervical exam performed, no leah bleeding noted but some older blood noted on exam.

## 2022-07-25 NOTE — PROGRESS NOTES
Pt admitted to  438 as outpt for cerclage removal.  EFM on.     1050- Dr Abraham Caldwell at bedside. 200- MD reviewed FMS. Orders to discharge pt home.

## 2022-07-25 NOTE — PROGRESS NOTES
Discharge instructions reviewed. Pt verbalizes understanding. Pt discharged home in stable condition with  at side.

## 2022-07-26 ENCOUNTER — ROUTINE PRENATAL (OUTPATIENT)
Dept: OBGYN CLINIC | Age: 27
End: 2022-07-26
Payer: COMMERCIAL

## 2022-07-26 VITALS — SYSTOLIC BLOOD PRESSURE: 138 MMHG | WEIGHT: 213.2 LBS | BODY MASS INDEX: 33.39 KG/M2 | DIASTOLIC BLOOD PRESSURE: 62 MMHG

## 2022-07-26 DIAGNOSIS — O36.5930 POOR FETAL GROWTH AFFECTING MANAGEMENT OF MOTHER IN THIRD TRIMESTER, SINGLE OR UNSPECIFIED FETUS: ICD-10-CM

## 2022-07-26 DIAGNOSIS — Z3A.37 37 WEEKS GESTATION OF PREGNANCY: ICD-10-CM

## 2022-07-26 DIAGNOSIS — Z13.89 SCREENING FOR GENITOURINARY CONDITION: ICD-10-CM

## 2022-07-26 DIAGNOSIS — O09.93 HIGH-RISK PREGNANCY IN THIRD TRIMESTER: Primary | ICD-10-CM

## 2022-07-26 LAB
GLUCOSE URINE, POC: NEGATIVE
PROTEIN,URINE, POC: NORMAL

## 2022-07-26 PROCEDURE — 76819 FETAL BIOPHYS PROFIL W/O NST: CPT | Performed by: OBSTETRICS & GYNECOLOGY

## 2022-07-27 ENCOUNTER — HOSPITAL ENCOUNTER (INPATIENT)
Age: 27
LOS: 2 days | Discharge: HOME | End: 2022-07-29
Attending: OBSTETRICS & GYNECOLOGY | Admitting: OBSTETRICS & GYNECOLOGY
Payer: COMMERCIAL

## 2022-07-27 ENCOUNTER — ANESTHESIA EVENT (OUTPATIENT)
Dept: LABOR AND DELIVERY | Age: 27
End: 2022-07-27
Payer: COMMERCIAL

## 2022-07-27 ENCOUNTER — ANESTHESIA (OUTPATIENT)
Dept: LABOR AND DELIVERY | Age: 27
End: 2022-07-27
Payer: COMMERCIAL

## 2022-07-27 PROBLEM — Z3A.37 37 WEEKS GESTATION OF PREGNANCY: Status: ACTIVE | Noted: 2022-07-27

## 2022-07-27 PROBLEM — Z37.9 NORMAL LABOR: Status: ACTIVE | Noted: 2022-07-27

## 2022-07-27 LAB
ALBUMIN SERPL-MCNC: 2.3 G/DL (ref 3.5–5)
ALBUMIN/GLOB SERPL: 0.6 {RATIO} (ref 1.2–3.5)
ALP SERPL-CCNC: 90 U/L (ref 50–130)
ALT SERPL-CCNC: 14 U/L (ref 12–65)
AMNISURE, POC: POSITIVE
ANION GAP SERPL CALC-SCNC: 6 MMOL/L (ref 7–16)
APTT PPP: 26.1 SEC (ref 24.1–35.1)
AST SERPL-CCNC: 15 U/L (ref 15–37)
BILIRUB SERPL-MCNC: 0.3 MG/DL (ref 0.2–1.1)
BUN SERPL-MCNC: 4 MG/DL (ref 6–23)
CALCIUM SERPL-MCNC: 8.5 MG/DL (ref 8.3–10.4)
CHLORIDE SERPL-SCNC: 107 MMOL/L (ref 98–107)
CO2 SERPL-SCNC: 24 MMOL/L (ref 21–32)
CREAT SERPL-MCNC: 0.6 MG/DL (ref 0.6–1)
CREAT UR-MCNC: 38 MG/DL
ERYTHROCYTE [DISTWIDTH] IN BLOOD BY AUTOMATED COUNT: 13.5 % (ref 11.9–14.6)
ERYTHROCYTE [DISTWIDTH] IN BLOOD BY AUTOMATED COUNT: 13.7 % (ref 11.9–14.6)
ERYTHROCYTE [DISTWIDTH] IN BLOOD BY AUTOMATED COUNT: 13.7 % (ref 11.9–14.6)
FIBRINOGEN PPP-MCNC: 447 MG/DL (ref 190–501)
GLOBULIN SER CALC-MCNC: 3.7 G/DL (ref 2.3–3.5)
GLUCOSE SERPL-MCNC: 81 MG/DL (ref 65–100)
HCT VFR BLD AUTO: 36.1 % (ref 35.8–46.3)
HCT VFR BLD AUTO: 36.4 % (ref 35.8–46.3)
HCT VFR BLD AUTO: 36.8 % (ref 35.8–46.3)
HGB BLD-MCNC: 12 G/DL (ref 11.7–15.4)
HGB BLD-MCNC: 12 G/DL (ref 11.7–15.4)
HGB BLD-MCNC: 12.6 G/DL (ref 11.7–15.4)
INR PPP: 1
LDH SERPL L TO P-CCNC: 137 U/L (ref 100–190)
Lab: NORMAL
MCH RBC QN AUTO: 30.8 PG (ref 26.1–32.9)
MCH RBC QN AUTO: 30.8 PG (ref 26.1–32.9)
MCH RBC QN AUTO: 31.2 PG (ref 26.1–32.9)
MCHC RBC AUTO-ENTMCNC: 33 G/DL (ref 31.4–35)
MCHC RBC AUTO-ENTMCNC: 33.2 G/DL (ref 31.4–35)
MCHC RBC AUTO-ENTMCNC: 34.2 G/DL (ref 31.4–35)
MCV RBC AUTO: 91.1 FL (ref 79.6–97.8)
MCV RBC AUTO: 92.8 FL (ref 79.6–97.8)
MCV RBC AUTO: 93.3 FL (ref 79.6–97.8)
NEGATIVE QC PASS/FAIL: NORMAL
NRBC # BLD: 0 K/UL (ref 0–0.2)
PLATELET # BLD AUTO: 113 K/UL (ref 150–450)
PLATELET # BLD AUTO: 126 K/UL (ref 150–450)
PLATELET # BLD AUTO: 139 K/UL (ref 150–450)
PMV BLD AUTO: 11.9 FL (ref 9.4–12.3)
PMV BLD AUTO: 12 FL (ref 9.4–12.3)
PMV BLD AUTO: 12.2 FL (ref 9.4–12.3)
POSITIVE QC PASS/FAIL: NORMAL
POTASSIUM SERPL-SCNC: 4.1 MMOL/L (ref 3.5–5.1)
PROT SERPL-MCNC: 6 G/DL (ref 6.3–8.2)
PROT UR-MCNC: 7 MG/DL
PROT/CREAT UR-RTO: 0.2
PROTHROMBIN TIME: 13.5 SEC (ref 12.6–14.5)
RBC # BLD AUTO: 3.89 M/UL (ref 4.05–5.2)
RBC # BLD AUTO: 3.9 M/UL (ref 4.05–5.2)
RBC # BLD AUTO: 4.04 M/UL (ref 4.05–5.2)
SODIUM SERPL-SCNC: 137 MMOL/L (ref 136–145)
URATE SERPL-MCNC: 3 MG/DL (ref 2.6–6)
WBC # BLD AUTO: 11.9 K/UL (ref 4.3–11.1)
WBC # BLD AUTO: 7.3 K/UL (ref 4.3–11.1)
WBC # BLD AUTO: 8.2 K/UL (ref 4.3–11.1)

## 2022-07-27 PROCEDURE — 36415 COLL VENOUS BLD VENIPUNCTURE: CPT

## 2022-07-27 PROCEDURE — 7210000100 HC LABOR FEE PER 1 HR

## 2022-07-27 PROCEDURE — 86901 BLOOD TYPING SEROLOGIC RH(D): CPT

## 2022-07-27 PROCEDURE — 85730 THROMBOPLASTIN TIME PARTIAL: CPT

## 2022-07-27 PROCEDURE — 7100000011 HC PHASE II RECOVERY - ADDTL 15 MIN

## 2022-07-27 PROCEDURE — 6370000000 HC RX 637 (ALT 250 FOR IP): Performed by: OBSTETRICS & GYNECOLOGY

## 2022-07-27 PROCEDURE — 83615 LACTATE (LD) (LDH) ENZYME: CPT

## 2022-07-27 PROCEDURE — 0W3R7ZZ CONTROL BLEEDING IN GENITOURINARY TRACT, VIA NATURAL OR ARTIFICIAL OPENING: ICD-10-PCS | Performed by: OBSTETRICS & GYNECOLOGY

## 2022-07-27 PROCEDURE — 7100000010 HC PHASE II RECOVERY - FIRST 15 MIN

## 2022-07-27 PROCEDURE — 86923 COMPATIBILITY TEST ELECTRIC: CPT

## 2022-07-27 PROCEDURE — 84156 ASSAY OF PROTEIN URINE: CPT

## 2022-07-27 PROCEDURE — 2500000003 HC RX 250 WO HCPCS: Performed by: OBSTETRICS & GYNECOLOGY

## 2022-07-27 PROCEDURE — 51701 INSERT BLADDER CATHETER: CPT

## 2022-07-27 PROCEDURE — 6360000002 HC RX W HCPCS: Performed by: OBSTETRICS & GYNECOLOGY

## 2022-07-27 PROCEDURE — 85384 FIBRINOGEN ACTIVITY: CPT

## 2022-07-27 PROCEDURE — 2580000003 HC RX 258: Performed by: OBSTETRICS & GYNECOLOGY

## 2022-07-27 PROCEDURE — 7220000101 HC DELIVERY VAGINAL/SINGLE

## 2022-07-27 PROCEDURE — 4A1HXCZ MONITORING OF PRODUCTS OF CONCEPTION, CARDIAC RATE, EXTERNAL APPROACH: ICD-10-PCS | Performed by: OBSTETRICS & GYNECOLOGY

## 2022-07-27 PROCEDURE — 59409 OBSTETRICAL CARE: CPT | Performed by: OBSTETRICS & GYNECOLOGY

## 2022-07-27 PROCEDURE — 84550 ASSAY OF BLOOD/URIC ACID: CPT

## 2022-07-27 PROCEDURE — 85610 PROTHROMBIN TIME: CPT

## 2022-07-27 PROCEDURE — 59025 FETAL NON-STRESS TEST: CPT

## 2022-07-27 PROCEDURE — 99284 EMERGENCY DEPT VISIT MOD MDM: CPT

## 2022-07-27 PROCEDURE — 6360000002 HC RX W HCPCS: Performed by: STUDENT IN AN ORGANIZED HEALTH CARE EDUCATION/TRAINING PROGRAM

## 2022-07-27 PROCEDURE — 3700000025 EPIDURAL BLOCK: Performed by: ANESTHESIOLOGY

## 2022-07-27 PROCEDURE — 85027 COMPLETE CBC AUTOMATED: CPT

## 2022-07-27 PROCEDURE — 00HU33Z INSERTION OF INFUSION DEVICE INTO SPINAL CANAL, PERCUTANEOUS APPROACH: ICD-10-PCS | Performed by: ANESTHESIOLOGY

## 2022-07-27 PROCEDURE — 1100000000 HC RM PRIVATE

## 2022-07-27 PROCEDURE — 80053 COMPREHEN METABOLIC PANEL: CPT

## 2022-07-27 RX ORDER — DOCUSATE SODIUM 100 MG/1
100 CAPSULE, LIQUID FILLED ORAL 2 TIMES DAILY
Status: DISCONTINUED | OUTPATIENT
Start: 2022-07-27 | End: 2022-07-29 | Stop reason: HOSPADM

## 2022-07-27 RX ORDER — SODIUM CHLORIDE 9 MG/ML
25 INJECTION, SOLUTION INTRAVENOUS PRN
Status: DISCONTINUED | OUTPATIENT
Start: 2022-07-27 | End: 2022-07-27 | Stop reason: HOSPADM

## 2022-07-27 RX ORDER — ONDANSETRON 8 MG/1
8 TABLET, ORALLY DISINTEGRATING ORAL EVERY 8 HOURS PRN
Status: DISCONTINUED | OUTPATIENT
Start: 2022-07-27 | End: 2022-07-29 | Stop reason: HOSPADM

## 2022-07-27 RX ORDER — MISOPROSTOL 200 UG/1
200 TABLET ORAL PRN
Status: DISCONTINUED | OUTPATIENT
Start: 2022-07-27 | End: 2022-07-29 | Stop reason: HOSPADM

## 2022-07-27 RX ORDER — IBUPROFEN 800 MG/1
800 TABLET ORAL EVERY 8 HOURS
Status: DISCONTINUED | OUTPATIENT
Start: 2022-07-27 | End: 2022-07-29 | Stop reason: HOSPADM

## 2022-07-27 RX ORDER — SODIUM CHLORIDE 9 MG/ML
INJECTION, SOLUTION INTRAVENOUS PRN
Status: DISCONTINUED | OUTPATIENT
Start: 2022-07-27 | End: 2022-07-29 | Stop reason: HOSPADM

## 2022-07-27 RX ORDER — ACETAMINOPHEN 325 MG/1
650 TABLET ORAL EVERY 4 HOURS PRN
Status: DISCONTINUED | OUTPATIENT
Start: 2022-07-27 | End: 2022-07-27 | Stop reason: HOSPADM

## 2022-07-27 RX ORDER — ONDANSETRON 2 MG/ML
4 INJECTION INTRAMUSCULAR; INTRAVENOUS EVERY 6 HOURS PRN
Status: DISCONTINUED | OUTPATIENT
Start: 2022-07-27 | End: 2022-07-27 | Stop reason: HOSPADM

## 2022-07-27 RX ORDER — SODIUM CHLORIDE 0.9 % (FLUSH) 0.9 %
5-40 SYRINGE (ML) INJECTION EVERY 12 HOURS SCHEDULED
Status: DISCONTINUED | OUTPATIENT
Start: 2022-07-27 | End: 2022-07-27 | Stop reason: HOSPADM

## 2022-07-27 RX ORDER — SODIUM CHLORIDE, SODIUM LACTATE, POTASSIUM CHLORIDE, CALCIUM CHLORIDE 600; 310; 30; 20 MG/100ML; MG/100ML; MG/100ML; MG/100ML
INJECTION, SOLUTION INTRAVENOUS CONTINUOUS
Status: DISCONTINUED | OUTPATIENT
Start: 2022-07-27 | End: 2022-07-27 | Stop reason: HOSPADM

## 2022-07-27 RX ORDER — MAGNESIUM CARB/ALUMINUM HYDROX 105-160MG
120 TABLET,CHEWABLE ORAL DAILY PRN
Status: DISCONTINUED | OUTPATIENT
Start: 2022-07-27 | End: 2022-07-29 | Stop reason: HOSPADM

## 2022-07-27 RX ORDER — SODIUM CHLORIDE 0.9 % (FLUSH) 0.9 %
5-40 SYRINGE (ML) INJECTION PRN
Status: DISCONTINUED | OUTPATIENT
Start: 2022-07-27 | End: 2022-07-27 | Stop reason: HOSPADM

## 2022-07-27 RX ORDER — METHYLERGONOVINE MALEATE 0.2 MG/1
200 TABLET ORAL EVERY 6 HOURS SCHEDULED
Status: DISCONTINUED | OUTPATIENT
Start: 2022-07-27 | End: 2022-07-28

## 2022-07-27 RX ORDER — SODIUM CHLORIDE, SODIUM LACTATE, POTASSIUM CHLORIDE, AND CALCIUM CHLORIDE .6; .31; .03; .02 G/100ML; G/100ML; G/100ML; G/100ML
500 INJECTION, SOLUTION INTRAVENOUS PRN
Status: DISCONTINUED | OUTPATIENT
Start: 2022-07-27 | End: 2022-07-27 | Stop reason: HOSPADM

## 2022-07-27 RX ORDER — ACETAMINOPHEN 500 MG
1000 TABLET ORAL EVERY 8 HOURS PRN
Status: DISCONTINUED | OUTPATIENT
Start: 2022-07-27 | End: 2022-07-29 | Stop reason: HOSPADM

## 2022-07-27 RX ORDER — SODIUM CHLORIDE, SODIUM LACTATE, POTASSIUM CHLORIDE, AND CALCIUM CHLORIDE .6; .31; .03; .02 G/100ML; G/100ML; G/100ML; G/100ML
1000 INJECTION, SOLUTION INTRAVENOUS PRN
Status: DISCONTINUED | OUTPATIENT
Start: 2022-07-27 | End: 2022-07-27 | Stop reason: HOSPADM

## 2022-07-27 RX ORDER — LANOLIN
CREAM (ML) TOPICAL PRN
Status: DISCONTINUED | OUTPATIENT
Start: 2022-07-27 | End: 2022-07-29 | Stop reason: HOSPADM

## 2022-07-27 RX ORDER — SODIUM CHLORIDE, SODIUM LACTATE, POTASSIUM CHLORIDE, CALCIUM CHLORIDE 600; 310; 30; 20 MG/100ML; MG/100ML; MG/100ML; MG/100ML
INJECTION, SOLUTION INTRAVENOUS CONTINUOUS
Status: DISCONTINUED | OUTPATIENT
Start: 2022-07-27 | End: 2022-07-29 | Stop reason: HOSPADM

## 2022-07-27 RX ORDER — OXYCODONE HYDROCHLORIDE 5 MG/1
5 TABLET ORAL EVERY 4 HOURS PRN
Status: DISCONTINUED | OUTPATIENT
Start: 2022-07-27 | End: 2022-07-29 | Stop reason: HOSPADM

## 2022-07-27 RX ORDER — SODIUM CHLORIDE 0.9 % (FLUSH) 0.9 %
5-40 SYRINGE (ML) INJECTION EVERY 12 HOURS SCHEDULED
Status: DISCONTINUED | OUTPATIENT
Start: 2022-07-27 | End: 2022-07-29 | Stop reason: HOSPADM

## 2022-07-27 RX ORDER — MISOPROSTOL 200 UG/1
600 TABLET ORAL ONCE
Status: COMPLETED | OUTPATIENT
Start: 2022-07-27 | End: 2022-07-27

## 2022-07-27 RX ORDER — ROPIVACAINE HYDROCHLORIDE 2 MG/ML
INJECTION, SOLUTION EPIDURAL; INFILTRATION; PERINEURAL PRN
Status: DISCONTINUED | OUTPATIENT
Start: 2022-07-27 | End: 2022-07-27 | Stop reason: SDUPTHER

## 2022-07-27 RX ORDER — METHYLERGONOVINE MALEATE 0.2 MG/ML
200 INJECTION INTRAVENOUS PRN
Status: DISCONTINUED | OUTPATIENT
Start: 2022-07-27 | End: 2022-07-29 | Stop reason: HOSPADM

## 2022-07-27 RX ORDER — SODIUM CHLORIDE 0.9 % (FLUSH) 0.9 %
5-40 SYRINGE (ML) INJECTION PRN
Status: DISCONTINUED | OUTPATIENT
Start: 2022-07-27 | End: 2022-07-29 | Stop reason: HOSPADM

## 2022-07-27 RX ORDER — METHYLERGONOVINE MALEATE 0.2 MG/ML
200 INJECTION INTRAVENOUS ONCE
Status: COMPLETED | OUTPATIENT
Start: 2022-07-27 | End: 2022-07-27

## 2022-07-27 RX ORDER — TRANEXAMIC ACID 10 MG/ML
1000 INJECTION, SOLUTION INTRAVENOUS
Status: COMPLETED | OUTPATIENT
Start: 2022-07-27 | End: 2022-07-27

## 2022-07-27 RX ORDER — MISOPROSTOL 200 UG/1
800 TABLET ORAL ONCE
Status: COMPLETED | OUTPATIENT
Start: 2022-07-27 | End: 2022-07-27

## 2022-07-27 RX ADMIN — ROPIVACAINE HYDROCHLORIDE 5 ML: 2 INJECTION, SOLUTION EPIDURAL; INFILTRATION; PERINEURAL at 13:51

## 2022-07-27 RX ADMIN — Medication 2 MILLI-UNITS/MIN: at 11:05

## 2022-07-27 RX ADMIN — SODIUM CHLORIDE, POTASSIUM CHLORIDE, SODIUM LACTATE AND CALCIUM CHLORIDE 1000 ML: 600; 310; 30; 20 INJECTION, SOLUTION INTRAVENOUS at 13:15

## 2022-07-27 RX ADMIN — Medication 2000 MG: at 19:48

## 2022-07-27 RX ADMIN — MISOPROSTOL 800 MCG: 200 TABLET ORAL at 19:20

## 2022-07-27 RX ADMIN — ROPIVACAINE HYDROCHLORIDE 10 ML/HR: 2 INJECTION, SOLUTION EPIDURAL; INFILTRATION; PERINEURAL at 13:52

## 2022-07-27 RX ADMIN — ONDANSETRON 4 MG: 2 INJECTION INTRAMUSCULAR; INTRAVENOUS at 19:33

## 2022-07-27 RX ADMIN — TRANEXAMIC ACID 1000 MG: 10 INJECTION, SOLUTION INTRAVENOUS at 18:47

## 2022-07-27 RX ADMIN — MISOPROSTOL 600 MCG: 200 TABLET ORAL at 18:56

## 2022-07-27 RX ADMIN — Medication 87.3 MILLI-UNITS/MIN: at 19:06

## 2022-07-27 RX ADMIN — METHYLERGONOVINE MALEATE 200 MCG: 0.2 INJECTION, SOLUTION INTRAMUSCULAR; INTRAVENOUS at 18:49

## 2022-07-27 RX ADMIN — ACETAMINOPHEN 650 MG: 325 TABLET, FILM COATED ORAL at 19:49

## 2022-07-27 RX ADMIN — SODIUM CHLORIDE, POTASSIUM CHLORIDE, SODIUM LACTATE AND CALCIUM CHLORIDE: 600; 310; 30; 20 INJECTION, SOLUTION INTRAVENOUS at 08:00

## 2022-07-27 RX ADMIN — Medication 1000 MG: at 19:27

## 2022-07-27 RX ADMIN — SODIUM CHLORIDE, POTASSIUM CHLORIDE, SODIUM LACTATE AND CALCIUM CHLORIDE 500 ML: 600; 310; 30; 20 INJECTION, SOLUTION INTRAVENOUS at 14:54

## 2022-07-27 ASSESSMENT — PAIN SCALES - GENERAL: PAINLEVEL_OUTOF10: 4

## 2022-07-27 ASSESSMENT — PAIN DESCRIPTION - LOCATION: LOCATION: NECK

## 2022-07-27 NOTE — ANESTHESIA PRE PROCEDURE
Department of Anesthesiology  Preprocedure Note       Name:  Ez Brown   Age:  33 y.o.  :  1995                                          MRN:  [id number]         Date:  2022      Surgeon: * No surgeons listed *    Procedure: * No procedures listed *    Medications prior to admission:   Prior to Admission medications    Medication Sig Start Date End Date Taking?  Authorizing Provider   famotidine (PEPCID) 10 MG tablet Take 10 mg by mouth 2 times daily    Historical Provider, MD   ondansetron (ZOFRAN) 4 MG tablet Take 1 tablet by mouth 3 times daily as needed for Nausea or Vomiting  Patient not taking: No sig reported 22   Makenna Cunningham MD   Prenatal Vit w/Pm-Ogddvxplx-MX (PNV PO) Take by mouth    Historical Provider, MD   acetaminophen (TYLENOL) 325 MG tablet Take 1,000 mg by mouth every 4 hours as needed  Patient not taking: No sig reported    Ar Automatic Reconciliation   calcium carbonate (OS-JORGE) 1250 (500 Ca) MG chewable tablet Take 1 tablet by mouth daily  Patient not taking: No sig reported    Ar Automatic Reconciliation   progesterone (PROMETRIUM) 200 MG CAPS capsule Insert capsule nightly into vagina until 36 weeks  Patient not taking: No sig reported 22   Ar Automatic Reconciliation       Current medications:    Current Facility-Administered Medications   Medication Dose Route Frequency Provider Last Rate Last Admin    lactated ringers infusion   IntraVENous Continuous Zaida Kilpatrick MD        lactated ringers bolus  500 mL IntraVENous PRN Zaida Kilpatrick MD        Or    lactated ringers bolus  1,000 mL IntraVENous PRN Zaida Kilpatrick MD        sodium chloride flush 0.9 % injection 5-40 mL  5-40 mL IntraVENous 2 times per day Zaida Kilpatrick MD        sodium chloride flush 0.9 % injection 5-40 mL  5-40 mL IntraVENous PRN Zaida Kilpatrick MD        0.9 % sodium chloride infusion  25 mL IntraVENous PRN Zaida Kilpatrick MD        butorphanol (STADOL) injection 1 mg  1 mg IntraVENous Q3H PRN Zunilda Paul MD        ondansetron [facility]) injection 4 mg  4 mg IntraVENous Q6H PRN Zunilda Paul MD        tranexamic acid-NaCl IVPB premix 1,000 mg  1,000 mg IntraVENous Once PRN Zunilda Paul MD        acetaminophen (TYLENOL) tablet 650 mg  650 mg Oral Q4H PRN Zunilda Paul MD        benzocaine-menthol (DERMOPLAST) 20-0.5 % spray   Topical PRN Zunilda Paul MD        oxytocin (PITOCIN) 30 units in 500 mL infusion  2-20 harvey-units/min IntraVENous Continuous Harman Cuba MD 4 mL/hr at 07/27/22 1156 4 harvey-units/min at 07/27/22 1156     Facility-Administered Medications Ordered in Other Encounters   Medication Dose Route Frequency Provider Last Rate Last Admin    ropivacaine (NAROPIN) 0.2% injection 0.2%   Epidural PRN MARJORIE Hoffman - CRNA   10 mL/hr at 07/27/22 1352       Allergies:  No Known Allergies    Problem List:    Patient Active Problem List   Diagnosis Code    History of thrombocytopenia Z86.2    Family history of other diseases of the musculoskeletal system and connective tissue Z82.69    Maternal varicella, non-immune Z28.39    Positive MADELEINE (antinuclear antibody) R76.8    High-risk pregnancy in second trimester O09.92    Current pregnancy with history of pre-term labor in second trimester O09.212    History of scoliosis Z87.39    History of mental disorder Z86.59    Cervical insufficiency during pregnancy in second trimester, antepartum O34.32    Neck pain M54.2    Cervical cerclage suture present in second trimester O34.32    Nausea & vomiting R11.2    Poor fetal growth affecting management of mother in third trimester O45.26    Normal labor O80, Z37.9       Past Medical History:        Diagnosis Date    Anxiety     Chlamydia     Depression     medication 08/2017-Decemeber 2017. doing well off meds    Headache     Postpartum depression     Scoliosis        Past Surgical History:  No past surgical history on file.     Social History:    Social History the last 72 hours. Coags: No results found for: PROTIME, INR, APTT    HCG (If Applicable): No results found for: PREGTESTUR, PREGSERUM, HCG, HCGQUANT     ABGs: No results found for: PHART, PO2ART, LYP4BBL, NAH3VJM, BEART, Y7TWHIVM     Type & Screen (If Applicable):  No results found for: LABABO, LABRH    Drug/Infectious Status (If Applicable):  No results found for: HIV, HEPCAB    COVID-19 Screening (If Applicable):   Lab Results   Component Value Date/Time    COVID19 Detected 06/29/2022 09:32 PM           Anesthesia Evaluation  Patient summary reviewed and Nursing notes reviewed  Airway: Mallampati: II  TM distance: >3 FB   Neck ROM: full  Mouth opening: > = 3 FB   Dental: normal exam         Pulmonary:Negative Pulmonary ROS and normal exam  breath sounds clear to auscultation                             Cardiovascular:Negative CV ROS  Exercise tolerance: good (>4 METS),           Rhythm: regular  Rate: normal                    Neuro/Psych:   Negative Neuro/Psych ROS  (+) headaches:, psychiatric history:            GI/Hepatic/Renal: Neg GI/Hepatic/Renal ROS            Endo/Other: Negative Endo/Other ROS                     ROS comment: Noted trend in lower platelets. Today's value >120. Denies all history or current signs or symptoms of coagulapathy Abdominal:             Vascular: negative vascular ROS. Other Findings:           Anesthesia Plan      epidural     ASA 3     (Discussed lower trending platelets and associated risks. She wished to proceed. Will obtain plt count prior to pulling catheter)  Induction: intravenous. Anesthetic plan and risks discussed with patient.                         José Miguel Ho MD   7/27/2022

## 2022-07-27 NOTE — PROCEDURES
Delivery Note    Patient Name: Catina Ahn  MRN: [id number]    Obstetrician:  Ora Elizabeth MD    Assistant: none    Pre-Delivery Diagnosis: Term pregnancy, Spontaneous labor, Single fetus, and Uncomplicated pregnancy    Post-Delivery Diagnosis: Male    Intrapartum Event: None    Procedure: Spontaneous vaginal delivery    Epidural:yes    Monitor:  EFM    Indications for instrumental delivery: none    Estimated Blood Loss: 200    Episiotomy: none    Laceration(s):  none    Presentation: Cephalic    Fetal Description: nelson    Fetal Position: Occiput Anterior    Birth Weight: 6-9    Birth Length:     Apgar - One Minute: 9    Apgar - Five Minutes: 9    Umbilical Cord: 3 vessels present and Cord blood sent to lab for type, Rh, and Veronika' test    Specimens: no           Complications:  none             Attending Attestation: I was present and scrubbed for the entire procedure.

## 2022-07-27 NOTE — H&P
Obstetrics History & Physical    Name: Areli Irby  Date: 2022 6:59 AM  MRN#: [id number]  : 1995  Age/Sex: 33 y. o.female  Admission Date: 2022  Admitting Provider: Channing Keller MD    HPI: Areli Irby is a 33 y.o.  female with Estimated Date of Delivery: 22 at 37w3d gestation who presents for possible spontaneous rupture of membranes. Her current obstetrical history is significant for  cerclage which was removed 2 days ago . Prenatal records reviewed. Woke up with large gushes of fluid which have continued since awakening. Amnisure positive with gross rupture. She reports good fetal movement. She denies vaginal bleeding. She states she is having irregular mild contractions. Past History:  Obstetrics History:  OB History    Para Term  AB Living   2 1 1 0 0 1   SAB IAB Ectopic Molar Multiple Live Births             1      # Outcome Date GA Lbr Zak/2nd Weight Sex Delivery Anes PTL Lv   2 Current            1 Term 19 37w1d 13:48 / 01:19 6 lb 8.6 oz (2.965 kg) M Vag-Spont EPI N VIKA       Medical History:  Past Medical History:   Diagnosis Date    Anxiety     Chlamydia     Depression     medication 2017-2017. doing well off meds    Headache     Postpartum depression     Scoliosis      No past surgical history on file. Social History     Tobacco Use    Smoking status: Former     Packs/day: 0.25     Types: Cigarettes    Smokeless tobacco: Never    Tobacco comments:     Quit smoking: none since +pt   Substance Use Topics    Alcohol use: No     Family History   Problem Relation Age of Onset    Hypertension Mother     Other Cousin         polydactyl    Bipolar Disorder Father     Cancer Maternal Grandfather         bone    Cancer Paternal Grandmother         cervical     Prior to Admission medications    Medication Sig Start Date End Date Taking?  Authorizing Provider   famotidine (PEPCID) 10 MG tablet Take 10 mg by mouth 2 times daily Historical Provider, MD   ondansetron (ZOFRAN) 4 MG tablet Take 1 tablet by mouth 3 times daily as needed for Nausea or Vomiting  Patient not taking: No sig reported 5/27/22   Aurelio Matamoros MD   Prenatal Vit w/Oj-Vjvxcdmeg-FD (PNV PO) Take by mouth    Historical Provider, MD   acetaminophen (TYLENOL) 325 MG tablet Take 1,000 mg by mouth every 4 hours as needed  Patient not taking: No sig reported    Ar Automatic Reconciliation   calcium carbonate (OS-JORGE) 1250 (500 Ca) MG chewable tablet Take 1 tablet by mouth daily  Patient not taking: No sig reported    Ar Automatic Reconciliation   progesterone (PROMETRIUM) 200 MG CAPS capsule Insert capsule nightly into vagina until 36 weeks  Patient not taking: No sig reported 4/6/22   Ar Automatic Reconciliation       Current Facility-Administered Medications:     lactated ringers infusion, , IntraVENous, Continuous, Juliana Gay MD    lactated ringers bolus, 500 mL, IntraVENous, PRN **OR** lactated ringers bolus, 1,000 mL, IntraVENous, PRN, Juliana Gay MD    sodium chloride flush 0.9 % injection 5-40 mL, 5-40 mL, IntraVENous, 2 times per day, Juliana Gay MD    sodium chloride flush 0.9 % injection 5-40 mL, 5-40 mL, IntraVENous, PRN, Juliana Gay MD    0.9 % sodium chloride infusion, 25 mL, IntraVENous, PRN, Juliana Gay MD    butorphanol (STADOL) injection 1 mg, 1 mg, IntraVENous, Q3H PRN, Juliana Gay MD    ondansetron (ZOFRAN) injection 4 mg, 4 mg, IntraVENous, Q6H PRN, Juliana Gay MD    tranexamic acid-NaCl IVPB premix 1,000 mg, 1,000 mg, IntraVENous, Once PRN, Juliana Gay MD    acetaminophen (TYLENOL) tablet 650 mg, 650 mg, Oral, Q4H PRN, Juliana Gay MD    benzocaine-menthol (DERMOPLAST) 20-0.5 % spray, , Topical, PRN, Juliana Gay MD  No Known Allergies    Physical Exam:  VITALS:  /66   Pulse 90   Temp 97.6 °F (36.4 °C) (Oral)   Resp 18   LMP 11/07/2021   SpO2 98%       CONSTITUTIONAL:  no apparent distress  FHTs: Reactive and reassuring and Category I strip  Membranes: spontaneously ruptured fluid is clear and AmniSure positive  Cervix: 4-5 cm dilated, 90% effacement, -3 station  Uterine activity/Contractions on monitor: Irregular on monitor  Presentation: cephalic    Labs: Review & Summary  Prenatal:  Lab Results   Component Value Date/Time    HGB 12.5 06/29/2022 08:27 PM    HCT 36.4 06/29/2022 08:27 PM     GBS negative      Assessment:  37w3d gestation  SROM; Not in active labor but beginning to have a few contractions  Obstetrical history significant for  cerclage removed 2 days ago .     Plan: admit for labor    Discussed with: Dr. Zaid Gan

## 2022-07-27 NOTE — ANESTHESIA PROCEDURE NOTES
Epidural Block    Patient location during procedure: OB  Start time: 7/27/2022 1:42 PM  End time: 7/27/2022 1:48 PM  Reason for block: labor epidural  Staffing  Performed: anesthesiologist   Anesthesiologist: Terri Talley MD  Epidural  Patient position: sitting  Prep: ChloraPrep  Patient monitoring: continuous pulse ox and frequent blood pressure checks  Approach: midline  Location: L3-4  Injection technique: ARLENE saline  Provider prep: mask and sterile gloves  Needle  Needle type: Tuohy   Needle gauge: 17 G  Epidural needle length (in): 10 cm. Needle insertion depth: 7 cm  Catheter type: end hole  Catheter size: 19 G  Catheter at skin depth: 12 cm  Test dose: negativeCatheter Secured: tegaderm and tape (liquid adhesive)  Assessment  Hemodynamics: stable  Attempts: 1  Outcomes: patient tolerated procedure well  Additional Notes  Risks discussed including continued pain, headache, inability to complete procedure do to complicated placement. 3 cc 1% lidocaine local at needle insertion site. Procedure performed without complication. Patient tolerated the procedure well.    Preanesthetic Checklist  Completed: patient identified, IV checked, risks and benefits discussed, equipment checked, pre-op evaluation, timeout performed, anesthesia consent given, oxygen available and monitors applied/VS acknowledged

## 2022-07-27 NOTE — PROGRESS NOTES
Anju Greer Savanna at bedside at [phone]. CRNA Florine Scheuermann bedside at 1339    Assisted pt to sitting up on bedside at 1341. Timeout completed at [phone] with SKYLAR BOYCE and myself at bedside. Test dose given at 1349. Negative reaction. Dose given at 1354. Pt assisted to lying back in left tilt position. See anesthesia record for details. See vital sign flow sheet for BP. Tolerated procedure well.

## 2022-07-27 NOTE — PROGRESS NOTES
Pt admitted to room 436 for labor. IV placed and labs drawn. POC reviewed. Pt oriented to room, call light within reach.

## 2022-07-27 NOTE — PROGRESS NOTES
Called to rosie. Patient getting txa, methergine, pitocin. Uterine tone intermittently boggy. Misoprostol 600 buccal added. Exploration revealed clots and retained products, mostly removed manually but banjo curette used to confirm removal of tissue. Bakri placed and inflated to 300 cc with good placement. Bleeding scant. Latest Reference Range & Units 7/27/22 19:04   Prothrombin Time 12.6 - 14.5 sec 13.5   INR -   1.0   PTT 24.1 - 35.1 SEC 26.1   Fibrinogen 190 - 501 mg/dL 447      Latest Reference Range & Units 7/27/22 19:04   WBC 4.3 - 11.1 K/uL 11.9 (H)   RBC 4.05 - 5.2 M/uL 3.90 (L)   Hemoglobin Quant 11.7 - 15.4 g/dL 12.0   Hematocrit 35.8 - 46.3 % 36.4   MCV 79.6 - 97.8 FL 93.3   MCH 26.1 - 32.9 PG 30.8   MCHC 31.4 - 35.0 g/dL 33.0   MPV 9.4 - 12.3 FL 12.0   RDW 11.9 - 14.6 % 13.7   Platelet Count 540 - 450 K/uL 139 (L)   (H): Data is abnormally high  (L): Data is abnormally low    Ancef 2 gm x 1 dose given. Patient stable, dr. Warren Willis updated. Ely Figueroa MD

## 2022-07-28 LAB
BASOPHILS # BLD: 0 K/UL (ref 0–0.2)
BASOPHILS NFR BLD: 0 % (ref 0–2)
DIFFERENTIAL METHOD BLD: ABNORMAL
EOSINOPHIL # BLD: 0 K/UL (ref 0–0.8)
EOSINOPHIL NFR BLD: 0 % (ref 0.5–7.8)
ERYTHROCYTE [DISTWIDTH] IN BLOOD BY AUTOMATED COUNT: 13.6 % (ref 11.9–14.6)
HCT VFR BLD AUTO: 29.7 % (ref 35.8–46.3)
HGB BLD-MCNC: 9.9 G/DL (ref 11.7–15.4)
IMM GRANULOCYTES # BLD AUTO: 0.1 K/UL (ref 0–0.5)
IMM GRANULOCYTES NFR BLD AUTO: 1 % (ref 0–5)
LYMPHOCYTES # BLD: 2.2 K/UL (ref 0.5–4.6)
LYMPHOCYTES NFR BLD: 17 % (ref 13–44)
MCH RBC QN AUTO: 30.5 PG (ref 26.1–32.9)
MCHC RBC AUTO-ENTMCNC: 33.3 G/DL (ref 31.4–35)
MCV RBC AUTO: 91.4 FL (ref 79.6–97.8)
MONOCYTES # BLD: 1.4 K/UL (ref 0.1–1.3)
MONOCYTES NFR BLD: 11 % (ref 4–12)
NEUTS SEG # BLD: 9.5 K/UL (ref 1.7–8.2)
NEUTS SEG NFR BLD: 72 % (ref 43–78)
NRBC # BLD: 0 K/UL (ref 0–0.2)
PLATELET # BLD AUTO: 116 K/UL (ref 150–450)
PMV BLD AUTO: 12.2 FL (ref 9.4–12.3)
RBC # BLD AUTO: 3.25 M/UL (ref 4.05–5.2)
WBC # BLD AUTO: 13.3 K/UL (ref 4.3–11.1)

## 2022-07-28 PROCEDURE — 6370000000 HC RX 637 (ALT 250 FOR IP): Performed by: OBSTETRICS & GYNECOLOGY

## 2022-07-28 PROCEDURE — 36415 COLL VENOUS BLD VENIPUNCTURE: CPT

## 2022-07-28 PROCEDURE — 1100000000 HC RM PRIVATE

## 2022-07-28 PROCEDURE — 85025 COMPLETE CBC W/AUTO DIFF WBC: CPT

## 2022-07-28 RX ADMIN — OXYCODONE 5 MG: 5 TABLET ORAL at 00:04

## 2022-07-28 RX ADMIN — IBUPROFEN 800 MG: 800 TABLET, FILM COATED ORAL at 09:45

## 2022-07-28 RX ADMIN — METHYLERGONOVINE MALEATE 200 MCG: 0.2 TABLET ORAL at 06:36

## 2022-07-28 RX ADMIN — OXYCODONE 5 MG: 5 TABLET ORAL at 16:20

## 2022-07-28 RX ADMIN — DOCUSATE SODIUM 100 MG: 100 CAPSULE ORAL at 21:21

## 2022-07-28 RX ADMIN — DOCUSATE SODIUM 100 MG: 100 CAPSULE ORAL at 09:46

## 2022-07-28 RX ADMIN — OXYCODONE 5 MG: 5 TABLET ORAL at 12:24

## 2022-07-28 RX ADMIN — METHYLERGONOVINE MALEATE 200 MCG: 0.2 TABLET ORAL at 00:45

## 2022-07-28 RX ADMIN — OXYCODONE 5 MG: 5 TABLET ORAL at 04:25

## 2022-07-28 RX ADMIN — ACETAMINOPHEN 1000 MG: 500 TABLET, FILM COATED ORAL at 04:25

## 2022-07-28 RX ADMIN — METHYLERGONOVINE MALEATE 200 MCG: 0.2 TABLET ORAL at 19:26

## 2022-07-28 RX ADMIN — IBUPROFEN 800 MG: 800 TABLET, FILM COATED ORAL at 19:26

## 2022-07-28 RX ADMIN — IBUPROFEN 800 MG: 800 TABLET, FILM COATED ORAL at 00:45

## 2022-07-28 RX ADMIN — METHYLERGONOVINE MALEATE 200 MCG: 0.2 TABLET ORAL at 12:24

## 2022-07-28 ASSESSMENT — PAIN DESCRIPTION - DESCRIPTORS
DESCRIPTORS: CRAMPING
DESCRIPTORS: CRAMPING
DESCRIPTORS: ACHING;CRAMPING

## 2022-07-28 ASSESSMENT — PAIN DESCRIPTION - LOCATION
LOCATION: ABDOMEN
LOCATION: ABDOMEN;BACK
LOCATION: ABDOMEN

## 2022-07-28 ASSESSMENT — PAIN SCALES - GENERAL
PAINLEVEL_OUTOF10: 4
PAINLEVEL_OUTOF10: 6
PAINLEVEL_OUTOF10: 5

## 2022-07-28 ASSESSMENT — PAIN DESCRIPTION - ORIENTATION
ORIENTATION: RIGHT;LOWER
ORIENTATION: ANTERIOR

## 2022-07-28 ASSESSMENT — PAIN DESCRIPTION - PAIN TYPE: TYPE: ACUTE PAIN

## 2022-07-28 ASSESSMENT — PAIN DESCRIPTION - ONSET: ONSET: GRADUAL

## 2022-07-28 ASSESSMENT — PAIN - FUNCTIONAL ASSESSMENT: PAIN_FUNCTIONAL_ASSESSMENT: ACTIVITIES ARE NOT PREVENTED

## 2022-07-28 NOTE — PROGRESS NOTES
CRITICAL CARE OUTREACH NURSE PROGRESS REPORT      SUBJECTIVE: Called to assess patient secondary to Code CHAPO yesterday, 7/27/22. Following per protocol    Vitals:    07/28/22 0200 07/28/22 0444 07/28/22 0738 07/28/22 1059   BP: 122/73 123/82 110/61 115/66   Pulse: 86 73 62 84   Resp: 16 16 17 17   Temp: 98.3 °F (36.8 °C) 97.5 °F (36.4 °C) 97.4 °F (36.3 °C) 97.5 °F (36.4 °C)   TempSrc: Oral  Oral Oral   SpO2:  99% 98% 98%        ASSESSMENT:  Neuro: WDL  Cardiac: WDL  Resp: WDL    PLAN:  Will follow per ICU Outreach protocol.  [address] [facility]  [phone]

## 2022-07-28 NOTE — PROGRESS NOTES
Phone call to Dr. Warren Willis regarding patients status and plan of care for the evening. Advised MD patient bleeding is stable and bakri output at 40 mls, reviewed lab results with Dr. Warren Willis,  Per MD patient may eat, okay to take murguia out once patient is able to feel legs again after turning Epidural off. Patient cleared to be transferred to MIU as planned. Verbal orders received for H&H in AM 0600 unless pt with increased bleeding. Dr. Warren Willis to round in AM and remove Bakri at that time.  All orders repeated and verified with MD.

## 2022-07-28 NOTE — CARE COORDINATION
Chart reviewed - no needs identified. SW met with patient to complete initial assessment. Patient confirms experiencing postpartum depression & anxiety after the birth of her first child (2019). Patient states that her grandmother passed away during this pregnancy, so she feels that this contributed to her emotional difficulties postpartum. Depression/anxiety lasted for approximately 6 months. Patient did not require medication or seek therapy. Patient reports feeling emotionally well during this pregnancy with no depression/anxiety. Patient given informational packet on  mood & anxiety disorders (resources/education). Family denies any additional needs from  at this time. Family has 's contact information should any needs/questions arise.     OLIVER Cisse, [address] [facility]   [phone]

## 2022-07-28 NOTE — ANESTHESIA POSTPROCEDURE EVALUATION
Department of Anesthesiology  Postprocedure Note    Patient: Fred Payan  MRN: [id number]  YOB: 1995  Date of evaluation: 7/28/2022      Procedure Summary     Date: 07/27/22 Room / Location:     Anesthesia Start: 8195 Anesthesia Stop: 0264    Procedure: Labor Analgesia Diagnosis:     Scheduled Providers:  Responsible Provider: Karine Manuel MD    Anesthesia Type: epidural ASA Status: 3          Anesthesia Type: No value filed. Javier Phase I:      Javier Phase II:        Anesthesia Post Evaluation    Patient location during evaluation: bedside  Patient participation: complete - patient participated  Level of consciousness: awake and alert  Airway patency: patent  Nausea & Vomiting: no nausea and no vomiting  Complications: no  Cardiovascular status: hemodynamically stable  Respiratory status: acceptable, nonlabored ventilation and spontaneous ventilation  Hydration status: euvolemic  Comments: /82   Pulse 73   Temp 97.5 °F (36.4 °C)   Resp 16   LMP 11/07/2021   SpO2 99%   The patient was satisfied with her labor epidural and denies any complications. Her lower extremities have returned to baseline neurologically.       Multimodal analgesia pain management approach

## 2022-07-28 NOTE — PROGRESS NOTES
CRITICAL CARE OUTREACH NURSE UPDATE    Vitals:    07/28/22 0444 07/28/22 0738 07/28/22 1059 07/28/22 1513   BP: 123/82 110/61 115/66 114/68   Pulse: 73 62 84 79   Resp: 16 17 17 17   Temp: 97.5 °F (36.4 °C) 97.4 °F (36.3 °C) 97.5 °F (36.4 °C) 97.5 °F (36.4 °C)   TempSrc:  Oral Oral Oral   SpO2: 99% 98% 98% 98%        ASSESSMENT:  Previous outreach assessment was reviewed. There have been no significant changes since previous assessment. PLAN:  Will follow per ICU Outreach protocol.     Erna Hernandez RN  [phone]

## 2022-07-28 NOTE — LACTATION NOTE
This note was copied from a baby's chart. Mom has been attempting at breast and bottle feeding. Noted CHAPO. Baby just took 20 ml by bottle, but mom open to practicing positioning. Assisted with attempt at breast in cross cradle on L. No latch. Started mom pumping. Got 0 colostrum. Discussed normal  behavior. May take baby a little while to figure out how to nurse well and consistently. Plan to continued trying at breast and pumping if no latch. Will follow output and weight loss. Will continue to assist with positioning and technique. Encouraged attempt at breast and follow plan.

## 2022-07-29 VITALS
SYSTOLIC BLOOD PRESSURE: 112 MMHG | DIASTOLIC BLOOD PRESSURE: 72 MMHG | RESPIRATION RATE: 17 BRPM | HEART RATE: 79 BPM | OXYGEN SATURATION: 98 % | TEMPERATURE: 98 F

## 2022-07-29 PROCEDURE — 6370000000 HC RX 637 (ALT 250 FOR IP): Performed by: OBSTETRICS & GYNECOLOGY

## 2022-07-29 RX ORDER — IBUPROFEN 800 MG/1
800 TABLET ORAL EVERY 8 HOURS PRN
Qty: 30 TABLET | Refills: 0 | Status: SHIPPED | OUTPATIENT
Start: 2022-07-29

## 2022-07-29 RX ADMIN — OXYCODONE 5 MG: 5 TABLET ORAL at 00:28

## 2022-07-29 RX ADMIN — IBUPROFEN 800 MG: 800 TABLET, FILM COATED ORAL at 11:05

## 2022-07-29 RX ADMIN — IBUPROFEN 800 MG: 800 TABLET, FILM COATED ORAL at 03:31

## 2022-07-29 ASSESSMENT — PAIN DESCRIPTION - ORIENTATION: ORIENTATION: ANTERIOR;LOWER

## 2022-07-29 ASSESSMENT — PAIN DESCRIPTION - DESCRIPTORS: DESCRIPTORS: CRAMPING

## 2022-07-29 ASSESSMENT — PAIN DESCRIPTION - LOCATION: LOCATION: ABDOMEN

## 2022-07-29 ASSESSMENT — PAIN SCALES - GENERAL: PAINLEVEL_OUTOF10: 1

## 2022-07-29 NOTE — LACTATION NOTE
This note was copied from a baby's chart. Lactation visit. Mostly bottle feeding. Has pumped some. Plans to pump at home. Insurance pump on order but not arriving  until Monday per mom. Discussed supply and demand. Provided mom with a manual pump to take home to use over weekend. Encouraged hand expression with pump use. Try at the breast, pump and supplement. Feeding well via bottle. Feeding plan given with volume needs. Questions answered.

## 2022-07-29 NOTE — LACTATION NOTE
This note was copied from a baby's chart. Individualized Feeding Plan for Breastfeeding   Lactation Services [phone]    As much as possible, hold your baby on your chest so babys bare skin is against your bare skin with a blanket covering babys back, especially 30 minutes before it is time for baby to eat. Watch for early feeding cues such as, licking lips, sucking motions, rooting, hands to mouth. Crying is a late feeding cue. Feed your baby at least 8 times in 24 hours, or more if your baby is showing feeding cues. If baby is sleepy put baby skin to skin and watch for hunger cues. To rouse baby: unwrap, undress, massage hands, feet, & back, change diaper, gently change babys position from lying to sitting. 15-20 minutes on the first breast of active breastfeeding is considered a good feeding. Good, active breastfeeding is when baby is alert, tugging the nipple, their ear may move, and you can hear swallows. Allow baby to finish the first side before changing sides. Sleeping at the breast or only brief, light sucks should not be considered a good, full breastfeed. At each feeding:  __x__1. Do Suck Practice on finger before each feeding until sucking pattern is smooth. Try using index finger. Nail down towards tongue. __x__2. Hand Express for a few minutes prior to latching to help start milk flow. __x__3. Baby needs to NURSE WELL x 15-20 minutes on at least first breast, burp and offer 2nd breast at every feeding. If no sustained latch only attempt at breast for 10 minutes. If baby does NOT latch on and feed well on at least one side, or for Pump and bottle feeding  you should:   __x__4. Double pump for 15 minutes with breast massage and compression. Hand express for an additional 2-3 minutes per side. Pump after each feeding attempt or poor feeding, up to 8 times per day. If you are not putting baby to the breast you need to pump 8 times a day.  Pump every 3 hours.    __x__5. Give baby all of the breast milk you obtain  from pumping and then supplement formula to complete feeding     AVERAGE INTAKES OF COLOSTRUM BY HEALTHY  INFANTS:  Time  Day Intake (ml per feeding)  Based on 8 feedings per day. 1st 24 hrs  1 2-10 ml  24-48 hrs  2 5-15 ml  48-72 hrs  3 15-30 ml (0.5-1 oz)  amount per feeding  72-96 hrs  4 30-45 ml (1-1.5oz)                          5-6      45-60 ml (1.5-2oz)                           7          60-75ml (2-2.5oz)      By day 7, baby will need 60-75 ml or 2-2.5 oz at each feeding based on 8 feedings per day & babys weight. (1oz = 30ml). Total milk volume needed in 24 hours by Day 7 is 16-18 oz per day based on baby's birthweight of 6-9. The more often baby eats, the less volume they need per feeding. If baby is eating more often than the minimum of 8 times per day, they may take less per feeding. Comments: If pumping, suggest using olive oil or coconut oil on your nipples before pumping to help reduce the friction. Use feeding plan until follow up with pediatrician. Continue to attempt at the breast for most feeds. Pump every 3 hours if no latch. Give all pumped colostrum/breastmilk at each feeding. Formula at each feeding as needed.

## 2022-07-29 NOTE — LACTATION NOTE

## 2022-07-29 NOTE — CARE COORDINATION
Verbal education/pamphlet provided on Medicaid transportation.     OLIVER Jiang, [address] [facility]   [phone]

## 2022-07-29 NOTE — DISCHARGE INSTRUCTIONS
Pelvis rest for 6 weeks: nothing in vagina  No lifting more than 15 lbs for 2 weeks  No push/pull (sweeping/vacuuming) motion for 2 weeks  No driving for 2 weeks  No tub baths for 6 weeks     Vaginal Childbirth: Care Instructions  Overview     Vaginal birth means delivering a baby through the birth canal (vagina). During labor, the uterus tightens (contracts) regularly to thin and open the cervixand to push the baby out through the birth canal.  Your body will slowly heal in the next few weeks. It's easy to get too tired and overwhelmed during the first weeks after your baby is born. Changes in your hormones can shift your mood without warning. You may find it hard to meet theextra demands on your energy and time. Take it easy on yourself. Follow-up care is a key part of your treatment and safety. Be sure to make and go to all appointments, and call your doctor if you are having problems. It's also a good idea to know your test results and keep alist of the medicines you take. How can you care for yourself at home? Vaginal bleeding and cramps  After delivery, you will have a bloody discharge from your vagina. This will turn pink within a week and then white or yellow after about 10 days. It may last for 2 to 4 weeks or longer, until the uterus has healed. Use sanitary pads until you stop bleeding. Using pads makes it easier to monitor your bleeding. Don't worry if you pass some blood clots, as long as they are smaller than a golf ball. If you have a tear or stitches in your vaginal area, change the pad at least every 4 hours. This will help prevent soreness and infection. You may have cramps for the first few days after childbirth. These are normal and occur as the uterus shrinks to normal size. Take an over-the-counter pain medicine, such as acetaminophen (Tylenol), ibuprofen (Advil, Motrin), or naproxen (Aleve), for cramps. Read and follow all instructions on the label.  Do not take aspirin, because it can cause more bleeding. Do not take two or more pain medicines at the same time unless the doctor told you to. Many pain medicines have acetaminophen, which is Tylenol. Too much acetaminophen (Tylenol) can be harmful. Stitches  If you have stitches, they will dissolve on their own and don't need to be removed. Follow your doctor's instructions for cleaning the stitched area. Put ice or a cold pack on your painful area for 10 to 20 minutes at a time, several times a day, for the first few days. Put a thin cloth between the ice and your skin. Sit in a few inches of warm water (sitz bath) 3 times a day and after bowel movements. The warm water helps with pain and itching. If you don't have a tub, a warm shower might help. Breast fullness  Your breasts may overfill (engorge) in the first few days after nursing, don't put warmth on your breasts or touch your breasts. Wear a bra that fits well and use ice until the fullness goes away. This usually takes 2 to 3 days. Put ice or a cold pack on your breast after nursing to reduce swelling and pain. Put a thin cloth between the ice and your skin. Activity  Eat a balanced diet. Don't try to lose weight by cutting calories. Keep taking your prenatal vitamins, or take a multivitamin. Get as much rest as you can. Try to take naps when your baby sleeps during the day. Get some exercise every day. But don't do any heavy exercise until your doctor says it is okay. Wait until you are healed (about 4 to 6 weeks) before you have sexual intercourse. Your doctor will tell you when it is okay to have sex. If you don't want to get pregnant, talk to your doctor about birth control. You can get pregnant even before your period returns. Also, you can get pregnant while you are breastfeeding. Mental health  It's normal to have some sadness, anxiety, sleeplessness, and mood swings after you go home.  If you feel upset or hopeless for more than a few days or are having trouble doing the things you need to do, talk to your doctor. Constipation and hemorrhoids  Drink plenty of fluids. If you have kidney, heart, or liver disease and have to limit fluids, talk with your doctor before you increase the amount of fluids you drink. Eat plenty of fiber each day. Have a bran muffin or bran cereal for breakfast. Try eating a piece of fruit for a mid-afternoon snack. For painful, itchy hemorrhoids, put ice or a cold pack on the area several times a day for 10 minutes at a time. Follow this by putting a warm compress on the area for another 10 to 20 minutes or by sitting in a shallow, warm bath. When should you call for help? Share this information with your partner, family, or a friend. They can help you watch for warning signs. Call 911 anytime you think you may need emergency care. For example, call if:    You have thoughts of harming yourself, your baby, or another person. You passed out (lost consciousness). You have chest pain, are short of breath, or cough up blood. You have a seizure. Call your doctor now or seek immediate medical care if:    You have signs of hemorrhage (too much bleeding), such as:  Heavy vaginal bleeding. This means that you are soaking through one or more pads in an hour. Or you pass blood clots bigger than an egg. Feeling dizzy or lightheaded, or you feel like you may faint. Feeling so tired or weak that you cannot do your usual activities. A fast or irregular heartbeat. New or worse belly pain. You have signs of infection, such as:  A fever. Vaginal discharge that smells bad. New or worse belly pain. You have symptoms of a blood clot in your leg (called a deep vein thrombosis), such as:  Pain in the calf, back of the knee, thigh, or groin. Redness and swelling in your leg or groin. You have signs of preeclampsia, such as:  Sudden swelling of your face, hands, or feet. New vision problems (such as dimness, blurring, or seeing spots).   A severe headache. Watch closely for changes in your health, and be sure to contact your doctor if:    Your vaginal bleeding isn't decreasing. You feel sad, anxious, or hopeless for more than a few days. You are having problems with your breasts or breastfeeding. Where can you learn more? Go to https://chpepiceweb.[facility]. org and sign in to your [employer] account. Enter L530 in the [employer] box to learn more about \"Vaginal Childbirth: Care Instructions. \"     If you do not have an account, please click on the \"Sign Up Now\" link. Current as of: February 23, 2022               Content Version: 13.3  © 2006-2022 Healthwise, Incorporated. Care instructions adapted under license by [facility] ([facility]). If you have questions about a medical condition or this instruction, always ask your healthcare professional. [address] any warranty or liability for your use of this information.

## 2022-07-29 NOTE — PROGRESS NOTES
Post-Partum Day Number 2 Progress/Discharge Note    Patient doing well post-partum without significant complaint. Voiding without difficulty, normal lochia, positive flatus. Bleeding has been minimal since Donnell balloon was removed    Vitals:  Patient Vitals for the past 8 hrs:   BP Temp Temp src Pulse Resp SpO2   22 0759 111/67 98.1 °F (36.7 °C) Oral 83 17 99 %   22 0320 117/75 98.1 °F (36.7 °C) Oral 82 17 99 %     Temp (24hrs), Av.8 °F (36.6 °C), Min:97.3 °F (36.3 °C), Max:98.2 °F (36.8 °C)      Vital signs stable, afebrile. Exam:  Patient without distress. Abdomen soft, fundus firm at level of umbilicus, non tender               Lower extremities are negative for swelling, cords or tenderness. Lab/Data Review:  Lab Results   Component Value Date    WBC 13.3 (H) 2022    HGB 9.9 (L) 2022    HCT 29.7 (L) 2022    MCV 91.4 2022     (L) 2022         Assessment and Plan:  Patient appears to be having uncomplicated post-partum course. Continue routine perineal care and maternal education. Plan discharge for today with follow up in our office in 1-2 weeks.

## 2022-07-29 NOTE — DISCHARGE SUMMARY
Obstetrical Discharge Summary     Name: Varsha Doe MRN: [id number]  SSN: [id number]    YOB: 1995  Age: 33 y.o. Sex: female      Allergies: Patient has no known allergies. Admit Date: 2022    Discharge Date: 2022     Admitting Physician: Blayne Diaz MD     Attending Physician:  DO Remigio Car Admission Diagnoses: Normal labor [O80, Z37.9]    * Discharge Diagnoses:   Information for the patient's :  Bishop Tate [[id number]]   @[id number]@      Additional Diagnoses: [unfilled]   Lab Results   Component Value Date/Time    ABORH O POSITIVE 2022 07:26 AM    RUBELLAEXT Immune 2022 03:40 PM      Immunization History   Administered Date(s) Administered    DTP 1996, 1997, 1997, 10/15/1997    DTaP (Infanrix) 2000    HPV Quadrivalent (Gardasil) 2007, 2007, 2008    Hib vaccine 1996, 1997, 1997, 10/15/1997    MMR 10/15/1997, 2000    Meningococcal MCV4P (Menactra) 2007    Polio IPV (IPOL) 1997, 2000    Polio OPV 1996, 1997    Tdap (Boostrix, Adacel) 2007, 2013    Varicella (Varivax) 10/15/1997, 2009       * Procedures: Vaginal delivery  * No surgery found *  [unfilled]         * Discharge Condition: [facility] Course: Postpartum course was complicated by postpartum hemorrhage, which added 0 days to the patient's length of stay.       * Disposition: Home    Discharge Medications:      Medication List        START taking these medications      ibuprofen 800 MG tablet  Commonly known as: ADVIL;MOTRIN  Take 1 tablet by mouth every 8 hours as needed for Pain            CONTINUE taking these medications      PNV PO            STOP taking these medications      acetaminophen 325 MG tablet  Commonly known as: TYLENOL     calcium carbonate 1250 (500 Ca) MG chewable tablet  Commonly known as: OS-JORGE     famotidine 10 MG tablet  Commonly known as: PEPCID     ondansetron 4 MG tablet  Commonly known as: ZOFRAN     progesterone 200 MG Caps capsule  Commonly known as: PROMETRIUM               Where to Get Your Medications        These medications were sent to [address] [place] [address]      Hours: 24-hours Phone: [phone]   ibuprofen 800 MG tablet         * Follow-up Care/Patient Instructions:   Activity: activity as tolerated  Diet: regular diet  Wound Care: keep wound clean and dry    [unfilled]

## 2022-07-31 LAB
ABO + RH BLD: NORMAL
BLD PROD TYP BPU: NORMAL
BLD PROD TYP BPU: NORMAL
BLOOD BANK DISPENSE STATUS: NORMAL
BLOOD BANK DISPENSE STATUS: NORMAL
BLOOD GROUP ANTIBODIES SERPL: NORMAL
BPU ID: NORMAL
BPU ID: NORMAL
CROSSMATCH RESULT: NORMAL
CROSSMATCH RESULT: NORMAL
SPECIMEN EXP DATE BLD: NORMAL
UNIT DIVISION: 0
UNIT DIVISION: 0

## 2022-08-10 ENCOUNTER — POSTPARTUM VISIT (OUTPATIENT)
Dept: OBGYN CLINIC | Age: 27
End: 2022-08-10
Payer: COMMERCIAL

## 2022-08-10 VITALS
HEIGHT: 67 IN | WEIGHT: 195 LBS | BODY MASS INDEX: 30.61 KG/M2 | DIASTOLIC BLOOD PRESSURE: 80 MMHG | SYSTOLIC BLOOD PRESSURE: 124 MMHG

## 2022-08-10 DIAGNOSIS — R30.0 BURNING WITH URINATION: Primary | ICD-10-CM

## 2022-08-10 LAB
BILIRUBIN, URINE, POC: NEGATIVE
BLOOD URINE, POC: NORMAL
GLUCOSE URINE, POC: NEGATIVE
KETONES, URINE, POC: NEGATIVE
LEUKOCYTE ESTERASE, URINE, POC: NORMAL
NITRITE, URINE, POC: NEGATIVE
PH, URINE, POC: 6 (ref 4.6–8)
PROTEIN,URINE, POC: NEGATIVE
SPECIFIC GRAVITY, URINE, POC: 1.02 (ref 1–1.03)
URINALYSIS CLARITY, POC: CLEAR
URINALYSIS COLOR, POC: YELLOW
UROBILINOGEN, POC: NORMAL

## 2022-08-10 PROCEDURE — 81003 URINALYSIS AUTO W/O SCOPE: CPT | Performed by: NURSE PRACTITIONER

## 2022-08-10 NOTE — PROGRESS NOTES
2 Week Postpartum Visit    Name: Troy Lopez    Date: 8/10/2022    Age: 33 y.o.    OB History          2    Para   2    Term   2       0    AB   0    Living   2         SAB        IAB        Ectopic        Molar        Multiple   0    Live Births   2              /80   Ht 5' 7\" (1.702 m)   Wt 195 lb (88.5 kg)   LMP 2021 (Exact Date)      Complains of burning with urination, headaches and bleeding heavier then a period. Delivered by Dr. Warren Willis on 22 by  without lacerations. Infant's Name: Rosio Mitchell  Infant's Gender: male   Birth Weight: 6 lbs 9.3 oz Feeding: breast  Desired Contraception: Ortho-Evra patches weekly for now, later on either vasectomy or tubal ligation. Current Outpatient Medications   Medication Sig Dispense Refill    Prenatal Vit w/Dh-Slrqzudpp-FS (PNV PO) Take by mouth      ibuprofen (ADVIL;MOTRIN) 800 MG tablet Take 1 tablet by mouth every 8 hours as needed for Pain 30 tablet 0     No current facility-administered medications for this visit. Physical Exam  Physical Exam  Constitutional:       Appearance: Normal appearance. Neurological:      Mental Status: She is alert. Psychiatric:         Mood and Affect: Mood normal.         Behavior: Behavior normal.   Vitals reviewed. Moods stable  Lochia appropriate  BP stable  Has a HA that comes and goes, not postural.  No visual disturbance. Rec rest, inc water. Reassured bp wnl. Call for any worsening sx. She also c/o R sided upper back pain. No CVA tenderness. No fever/chills. Will check urine cx. Likely musculoskeletal, encouraged stretching, ibuprofen prn.    C/O burning with urination---UA today wnl. Will check urine cx  Breastfeeding, with latch difficulties. No breast complaints. she will likely no longer breastfeed, but seeing lactation tomorrow for consult. She wishes to start orthoevra patches for [facility].    Disc if still breastfeeding risk of decrease in supply, wishes to proceed. Will send rx, pt to begin patches at 4 wk PP. Disc no smoking on patches. Rtc 4 wk for 6wk PP visit            Orders Placed This Encounter   Procedures    Culture, Urine     Standing Status:   Future     Standing Expiration Date:   8/10/2023     Order Specific Question:   Specify (ex-cath, midstream, cysto, etc)? Answer:   midstream    AMB POC URINALYSIS DIP STICK AUTO W/O MICRO     No follow-up provider specified. Supervising physician is Dr. Marly Rodriguez. Greater than 50% of this 20 minute visit is spent in counseling to the above topics.     Patricia Plan, APRN - CNP

## 2022-08-13 LAB
BACTERIA SPEC CULT: NORMAL
SERVICE CMNT-IMP: NORMAL

## 2022-09-07 ENCOUNTER — POSTPARTUM VISIT (OUTPATIENT)
Dept: OBGYN CLINIC | Age: 27
End: 2022-09-07

## 2022-09-07 VITALS
SYSTOLIC BLOOD PRESSURE: 124 MMHG | BODY MASS INDEX: 31.52 KG/M2 | HEIGHT: 67 IN | DIASTOLIC BLOOD PRESSURE: 82 MMHG | WEIGHT: 200.8 LBS

## 2022-09-07 PROCEDURE — 99902 PR PRENATAL VISIT: CPT | Performed by: OBSTETRICS & GYNECOLOGY

## 2022-09-07 NOTE — PROGRESS NOTES
6 Week Postpartum Visit    Name: Wenda Closs    Date: 2022    Age: 33 y.o.    OB History          2    Para   2    Term   2       0    AB   0    Living   2         SAB        IAB        Ectopic        Molar        Multiple   0    Live Births   2              LMP 2021        Delivered by Dr. Kirti Meraz on 22 by  without lacerations. Infant's Name: Jessica Client             Infant's Gender: male   Birth Weight: 6 lbs 9.3 oz        Feeding:   Desired Contraception: Ortho-Evra patches weekly for now, later on either vasectomy or tubal ligation. Last Pap smear date: 2022   Last Pap smear result:Negative, STD negative     Current Outpatient Medications   Medication Sig Dispense Refill    norelgestromin-ethinyl estradiol (ORTHO EVRA) 150-35 MCG/24HR Place 1 patch onto the skin once a week 3 patch 11    ibuprofen (ADVIL;MOTRIN) 800 MG tablet Take 1 tablet by mouth every 8 hours as needed for Pain 30 tablet 0    Prenatal Vit w/Uc-Geudjnaex-HO (PNV PO) Take by mouth       No current facility-administered medications for this visit. [email or web address]    Physical Exam  Physical Exam       There are no diagnoses linked to this encounter. No follow-up provider specified.     Isauro Nicole RN

## 2022-09-07 NOTE — PROGRESS NOTES
6 Week Postpartum Visit    Name: Francesca Garland    Date: 2022    Age: 33 y.o.    OB History          2    Para   2    Term   2       0    AB   0    Living   2         SAB        IAB        Ectopic        Molar        Multiple   0    Live Births   2              Ht 5' 7\" (1.702 m)   Wt 200 lb 12.8 oz (91.1 kg)   LMP 2021      Delivered by Dr. Torres Knight on 22 by  without lacerations. She complains of bleeding one hour after exercising on 2022. Blood started from dark red to bright red. She is soaking through 5 regular size pads daily since then, and is still ongoing today. Complaints of pelvic pain after re-starting exercise regime. Complains of chest pain starting 2022. Patient has hx of anxiety states anxiety has worsened since she left hospital. She C/O numbness and tingling in both hands and foot.    States today no  bleeding only spotting- showed me pad- only covered 1/3 of pad from 3 days ago  Patient Active Problem List    Diagnosis Date Noted    Normal labor 2022    37 weeks gestation of pregnancy 2022    Nausea & vomiting 2022    Poor fetal growth affecting management of mother in third trimester 06/10/2022    Cervical cerclage suture present in second trimester 2022    Cervical insufficiency during pregnancy in second trimester, antepartum 2022    Maternal varicella, non-immune 2022    History of thrombocytopenia 2022    High-risk pregnancy in second trimester 2022    Current pregnancy with history of pre-term labor in second trimester 2022    History of scoliosis 2022    History of mental disorder 2022    Family history of other diseases of the musculoskeletal system and connective tissue 2018    Positive MADELEINE (antinuclear antibody) 2017    Neck pain 2017         Infant's Name: Rosanne Bernard             Infant's Gender: male   Birth Weight: 6 lbs 9.3 oz        Feeding: Patient is bottle feeding  Desired Contraception: Hakeem Delgado patches- has not started  Last Pap smear date: 2/4/2022   Last Pap smear result:Negative, STD negative       Current Outpatient Medications   Medication Sig Dispense Refill    norelgestromin-ethinyl estradiol (ORTHO EVRA) 150-35 MCG/24HR Place 1 patch onto the skin once a week 3 patch 11    ibuprofen (ADVIL;MOTRIN) 800 MG tablet Take 1 tablet by mouth every 8 hours as needed for Pain 30 tablet 0    Prenatal Vit w/Sa-Erfpjlpwu-IL (PNV PO) Take by mouth       No current facility-administered medications for this visit. States has increased anxiety but denies any homicidal or suicidal ideation-states chest pain assoc with anxiety  Feels like overstimulated has autistic son-   Feels like she does not need antidepressant     Asked pt to hold off on starting patch until she gets evaluation with PCP re on and off chest pain  Declines any chest pain or SOB now    Physical Exam  Physical Exam   Abdomen soft non tender      Katie Rollins was seen today for postpartum care. Diagnoses and all orders for this visit:    Postpartum care and examination    No follow-up provider specified.     April West, [address]

## 2022-09-13 ENCOUNTER — HOSPITAL ENCOUNTER (EMERGENCY)
Dept: GENERAL RADIOLOGY | Age: 27
Discharge: HOME | End: 2022-09-16
Payer: COMMERCIAL

## 2022-09-13 ENCOUNTER — HOSPITAL ENCOUNTER (EMERGENCY)
Age: 27
Discharge: HOME | End: 2022-09-13
Attending: EMERGENCY MEDICINE
Payer: COMMERCIAL

## 2022-09-13 VITALS
OXYGEN SATURATION: 99 % | TEMPERATURE: 98 F | RESPIRATION RATE: 16 BRPM | SYSTOLIC BLOOD PRESSURE: 135 MMHG | BODY MASS INDEX: 31.39 KG/M2 | DIASTOLIC BLOOD PRESSURE: 90 MMHG | HEART RATE: 73 BPM | HEIGHT: 67 IN | WEIGHT: 200 LBS

## 2022-09-13 DIAGNOSIS — R07.89 CHEST PRESSURE: Primary | ICD-10-CM

## 2022-09-13 LAB
ALBUMIN SERPL-MCNC: 3.6 G/DL (ref 3.5–5)
ALBUMIN/GLOB SERPL: 0.9 {RATIO} (ref 1.2–3.5)
ALP SERPL-CCNC: 99 U/L (ref 50–136)
ALT SERPL-CCNC: 22 U/L (ref 12–65)
ANION GAP SERPL CALC-SCNC: 3 MMOL/L (ref 4–13)
AST SERPL-CCNC: 20 U/L (ref 15–37)
BILIRUB SERPL-MCNC: 0.3 MG/DL (ref 0.2–1.1)
BILIRUB UR QL: NEGATIVE
BUN SERPL-MCNC: 17 MG/DL (ref 6–23)
CALCIUM SERPL-MCNC: 8.8 MG/DL (ref 8.3–10.4)
CHLORIDE SERPL-SCNC: 112 MMOL/L (ref 101–110)
CO2 SERPL-SCNC: 24 MMOL/L (ref 21–32)
CREAT SERPL-MCNC: 0.7 MG/DL (ref 0.6–1)
D DIMER PPP FEU-MCNC: 0.35 UG/ML(FEU)
EKG ATRIAL RATE: 62 BPM
EKG DIAGNOSIS: NORMAL
EKG P AXIS: 73 DEGREES
EKG P-R INTERVAL: 150 MS
EKG Q-T INTERVAL: 388 MS
EKG QRS DURATION: 82 MS
EKG QTC CALCULATION (BAZETT): 393 MS
EKG R AXIS: 93 DEGREES
EKG T AXIS: 64 DEGREES
EKG VENTRICULAR RATE: 62 BPM
ERYTHROCYTE [DISTWIDTH] IN BLOOD BY AUTOMATED COUNT: 12.7 % (ref 11.9–14.6)
GLOBULIN SER CALC-MCNC: 3.8 G/DL (ref 2.3–3.5)
GLUCOSE SERPL-MCNC: 95 MG/DL (ref 65–100)
GLUCOSE UR QL STRIP.AUTO: NEGATIVE MG/DL
HCT VFR BLD AUTO: 41.2 % (ref 35.8–46.3)
HGB BLD-MCNC: 13 G/DL (ref 11.7–15.4)
KETONES UR-MCNC: NEGATIVE MG/DL
LEUKOCYTE ESTERASE UR QL STRIP: NEGATIVE
MCH RBC QN AUTO: 28.8 PG (ref 26.1–32.9)
MCHC RBC AUTO-ENTMCNC: 31.6 G/DL (ref 31.4–35)
MCV RBC AUTO: 91.2 FL (ref 79.6–97.8)
NITRITE UR QL: NEGATIVE
NRBC # BLD: 0 K/UL (ref 0–0.2)
PH UR: 7 [PH] (ref 5–9)
PLATELET # BLD AUTO: 213 K/UL (ref 150–450)
PMV BLD AUTO: 10.7 FL (ref 9.4–12.3)
POTASSIUM SERPL-SCNC: 4.2 MMOL/L (ref 3.5–5.1)
PROT SERPL-MCNC: 7.4 G/DL (ref 6.3–8.2)
PROT UR QL: NEGATIVE MG/DL
RBC # BLD AUTO: 4.52 M/UL (ref 4.05–5.2)
RBC # UR STRIP: NEGATIVE /UL
SERVICE CMNT-IMP: ABNORMAL
SODIUM SERPL-SCNC: 139 MMOL/L (ref 136–145)
SP GR UR: 1.02 (ref 1–1.02)
TROPONIN I SERPL HS-MCNC: 3.1 PG/ML (ref 0–14)
TROPONIN I SERPL HS-MCNC: <3 PG/ML (ref 0–14)
UROBILINOGEN UR QL: 0.2 EU/DL (ref 0.2–1)
WBC # BLD AUTO: 4.3 K/UL (ref 4.3–11.1)

## 2022-09-13 PROCEDURE — 81003 URINALYSIS AUTO W/O SCOPE: CPT

## 2022-09-13 PROCEDURE — 99285 EMERGENCY DEPT VISIT HI MDM: CPT

## 2022-09-13 PROCEDURE — 93005 ELECTROCARDIOGRAM TRACING: CPT | Performed by: EMERGENCY MEDICINE

## 2022-09-13 PROCEDURE — 85379 FIBRIN DEGRADATION QUANT: CPT

## 2022-09-13 PROCEDURE — 85027 COMPLETE CBC AUTOMATED: CPT

## 2022-09-13 PROCEDURE — 71046 X-RAY EXAM CHEST 2 VIEWS: CPT

## 2022-09-13 PROCEDURE — 80053 COMPREHEN METABOLIC PANEL: CPT

## 2022-09-13 PROCEDURE — 84484 ASSAY OF TROPONIN QUANT: CPT

## 2022-09-13 ASSESSMENT — ENCOUNTER SYMPTOMS
COLOR CHANGE: 0
SHORTNESS OF BREATH: 0
VOMITING: 0
NAUSEA: 0
DIARRHEA: 0
ABDOMINAL PAIN: 0

## 2022-09-13 ASSESSMENT — PAIN DESCRIPTION - LOCATION: LOCATION: CHEST

## 2022-09-13 ASSESSMENT — PAIN SCALES - GENERAL: PAINLEVEL_OUTOF10: 6

## 2022-09-13 NOTE — DISCHARGE INSTRUCTIONS
You were evaluated in the emergency department today for chest pressure. Cardiac work-up today is very reassuring  EKG and chest x-ray also very reassuring well as physical exam  D-dimer, the protein that is an indication of blood clot came back well within the normal limits    Please follow-up with your OB/GYN within the next week or 2  Please contact the number listed on your discharge paperwork to establish new primary care. You stated that you currently have a primary care provider however you do not live in that same area. It might be worth it to follow-up with that primary care provider until you are established at a new provider. Return to the emergency department if you have worsening symptoms, shortness of breath, signs and symptoms of stroke, chest pain, general worsening of your condition. We would love to help you get a primary care doctor for follow-up after your emergency department visit. Please call [phone] between 7AM - 6PM Monday to Friday. A care navigator will be able to assist you with setting up a doctor close to your home.

## 2022-09-13 NOTE — ED PROVIDER NOTES
Emergency Department Provider Note                   PCP:                MARJORIE Willson CNP               Age: 33 y.o. Sex: female       ICD-10-CM    1. Chest pressure  R07.89           DISPOSITION Decision To Discharge 09/13/2022 02:48:01 PM       MDM  Number of Diagnoses or Management Options  Chest pressure  Diagnosis management comments: Vital signs reviewed, patient stable, NAD, afebrile, nontoxic in appearance     Will obtain cardiac labs, chest x-ray, urine hCG and urine dip  Will obtain EKG and D-dimer    Physical exam is reassuring. Lungs are clear to auscultation bilaterally, bilateral radial and PT pulses are 2+ and equal, abdomen is soft and nontender, no lower extremity edema. Initial troponin less than 3, repeat troponin 3.1  CBC grossly normal  No concerning findings on CMP  Urinalysis grossly normal  Urine hCG negative  D-dimer 0.35 and well within the normal limits. Will not Perform CT chest PE protocol and D-dimer    Chest x-ray negative for acute cardiopulmonary abnormality, cardiac silhouette within normal limits  No STEMI on EKG  Low clinical suspicion for ACS    Based on history, physical exam, lab work, imaging and EKG, I feel no further lab work or imaging is warranted this time. I discussed physical exam findings, laboratory and/or imaging findings, treatment and follow-up with the patient and those who were present. I answered any questions they had. They verbalized that they understood and were in agreement with treatment and disposition. I discussed signs and symptoms that would warrant a prompt return to the emergency department with the patient. I included the signs and symptoms on discharge paperwork. Patient verbalized that they understood. Patient discharged home in stable condition. She is to follow-up with her current primary care provider while she establishes new primary care using the number given on her discharge paperwork.   Reiterated strict return to ED precautions. Informed patient that I called the cardiologist back line for a chest pain follow-up within the next 2 weeks. Patient verbalized that she understood and she was in agreement with treatment and follow-up. I discussed this patient with my attending, Dr. Karolyn De La Garza, who is in agreement with treatment and disposition. Amount and/or Complexity of Data Reviewed  Clinical lab tests: ordered and reviewed  Tests in the radiology section of CPT®: ordered and reviewed  Review and summarize past medical records: yes  Independent visualization of images, tracings, or specimens: yes (Independent visualization of imaging)    Risk of Complications, Morbidity, and/or Mortality  Presenting problems: moderate  Diagnostic procedures: moderate  Management options: low    Patient Progress  Patient progress: stable       Orders Placed This Encounter   Procedures    XR CHEST (2 VW)    CBC    Comprehensive Metabolic Panel    Troponin    D-Dimer, Quantitative    Cardiac Monitor    Pulse Oximetry    POCT Urine Dipstick    POCT Urinalysis no Micro    EKG 12 Lead    Saline lock IV        Medications - No data to display    Discharge Medication List as of 2022  2:55 PM           Rose Mary Pruitt is a 33 y.o. female who presents to the Emergency Department with chief complaint of    Chief Complaint   Patient presents with    Chest Pain      52-year-old female  who is 7 weeks postpartum vaginal birth with history of anxiety, depression, postpartum depression, atypical chest pain, scoliosis presents to the emergency department today with chief complaint of intermittent chest pressure this morning. Patient denies shortness of breath,, diaphoresis, abdominal pain, nausea, vomiting, diarrhea, fevers or chills, headache, lower extremity swelling. Patient states that since delivery 7 weeks ago she has had a couple instances of intermittent chest pain. Nothing makes patient's condition better. Nothing makes patient's condition worse. No treatments tried. The history is provided by the patient. No  was used. All other systems reviewed and are negative unless otherwise stated in the history of present illness section. Review of Systems   Constitutional:  Negative for chills, fatigue and fever. Respiratory:  Negative for shortness of breath. Cardiovascular:  Negative for chest pain, palpitations and leg swelling. Chest pressure   Gastrointestinal:  Negative for abdominal pain, diarrhea, nausea and vomiting. Skin:  Negative for color change. Neurological:  Negative for weakness and headaches. All other systems reviewed and are negative. Past Medical History:   Diagnosis Date    Anxiety     Chlamydia     Depression     medication 08/2017-Decemeber 2017. doing well off meds    Headache     Postpartum depression     Scoliosis         History reviewed. No pertinent surgical history. Family History   Problem Relation Age of Onset    Hypertension Mother     Other Cousin         polydactyl    Bipolar Disorder Father     Cancer Maternal Grandfather         bone    Cancer Paternal Grandmother         cervical        Social History     Socioeconomic History    Marital status: Single     Spouse name: None    Number of children: None    Years of education: None    Highest education level: None   Tobacco Use    Smoking status: Former     Packs/day: 0.25     Types: Cigarettes    Smokeless tobacco: Never    Tobacco comments:     Quit smoking: none since +pt   Substance and Sexual Activity    Alcohol use: No    Drug use: Not Currently    Sexual activity: Yes     Partners: Male     Birth control/protection: None        Allergies: Patient has no known allergies.     Discharge Medication List as of 9/13/2022  2:55 PM        CONTINUE these medications which have NOT CHANGED    Details   norelgestromin-ethinyl estradiol (ORTHO EVRA) 150-35 MCG/24HR Place 1 patch onto the skin once a week, Disp-3 patch, R-11Normal      ibuprofen (ADVIL;MOTRIN) 800 MG tablet Take 1 tablet by mouth every 8 hours as needed for Pain, Disp-30 tablet, R-0Normal      Prenatal Vit w/Ui-Dcporyysr-ZJ (PNV PO) Take by mouthHistorical Med              Vitals signs and nursing note reviewed. Patient Vitals for the past 4 hrs:   Temp Pulse Resp BP SpO2   09/13/22 1503 98 °F (36.7 °C) 73 16 (!) 135/90 99 %   09/13/22 1431 -- 61 19 129/84 --          Physical Exam  Vitals and nursing note reviewed. Constitutional:       General: She is not in acute distress. Appearance: Normal appearance. She is obese. She is not ill-appearing, toxic-appearing or diaphoretic. HENT:      Head: Normocephalic and atraumatic. Nose: Nose normal.      Mouth/Throat:      Mouth: Mucous membranes are moist.      Pharynx: Oropharynx is clear. Eyes:      General: No scleral icterus. Extraocular Movements: Extraocular movements intact. Conjunctiva/sclera: Conjunctivae normal.      Pupils: Pupils are equal, round, and reactive to light. Cardiovascular:      Rate and Rhythm: Normal rate. Pulses: Normal pulses. Heart sounds: Normal heart sounds. Comments: biLateral radial pulses 2+ and equal  Bilateral PT pulses 2+ and equal  Pulmonary:      Effort: Pulmonary effort is normal. No respiratory distress. Breath sounds: Normal breath sounds. No stridor. No wheezing, rhonchi or rales. Abdominal:      General: Bowel sounds are normal.      Palpations: Abdomen is soft. Tenderness: There is no abdominal tenderness. There is no right CVA tenderness, left CVA tenderness, guarding or rebound. Musculoskeletal:         General: Normal range of motion. Cervical back: Normal range of motion and neck supple. No rigidity. Right lower leg: No edema. Left lower leg: No edema. Lymphadenopathy:      Cervical: No cervical adenopathy. Skin:     General: Skin is warm and dry.       Capillary Refill: Capillary refill takes less than 2 seconds. Coloration: Skin is not jaundiced or pale. Findings: No bruising, erythema, lesion or rash. Neurological:      General: No focal deficit present. Mental Status: She is alert and oriented to person, place, and time. Psychiatric:         Mood and Affect: Mood normal.         Behavior: Behavior normal.         Thought Content: Thought content normal.         Judgment: Judgment normal.        Procedures    ED EKG Interpretation  EKG was interpreted in the absence of a cardiologist.    Rate: 62  EKG Interpretation: Sinus arrhythmia, normal axis, no QT prolongation  ST Segments: No significant ST elevation or depression, no STEMI    [unfilled]     XR CHEST (2 VW)   Final Result   No acute cardiopulmonary abnormality. ED Course as of 09/13/22 1824   Tue Sep 13, 2022   1129 Troponin:    Troponin, High Sensitivity <3.0 [JG]   1130 CBC:    WBC 4.3   RBC 4.52   Hemoglobin Quant 13.0   Hematocrit 41.2   MCV 91.2   MCH 28.8   MCHC 31.6   RDW 12.7   Platelet Count 751   MPV 10.7   Nucleated Red Blood Cells 0.00 [JG]   1130 Comprehensive Metabolic Panel(!):    Sodium 139   Potassium 4.2   Chloride 112(!)   CO2 24   Anion Gap 3(!)   Glucose, Random 95   BUN,BUNPL 17   Creatinine 0.70   GFR African American >60   GFR Non- >60   CALCIUM, SERUM, 832246 8.8   Bilirubin 0.3   ALT 22   AST 20   Alk Phosphatase 99   Total Protein 7.4   Albumin 3.6   Globulin 3.8(!)   ALBUMIN/GLOBULIN RATIO 0.9(!) [JG]   1131 CBC:    WBC 4.3   RBC 4.52   Hemoglobin Quant 13.0   Hematocrit 41.2   MCV 91.2   MCH 28.8   MCHC 31.6   RDW 12.7   Platelet Count 559   MPV 10.7   Nucleated Red Blood Cells 0.00 [JG]   1131 XR CHEST (2 VW)     Cardiac Silhouette: Within normal limits in size. Mediastinum: Normal mediastinal contours. Lungs: No airspace consolidation. No pneumothorax or sizable pleural effusion.      Upper Abdomen: Normal Miscellaneous: No fracture or suspicious osseous lesion. IMPRESSION:  No acute cardiopulmonary abnormality. [JG]   1259 POCT Urinalysis no Micro(!):    Specific Gravity, Urine, POC 1.025(!)   pH, Urine, POC 7.0   Protein, Urine, POC Negative   Glucose, UA POC Negative   Ketones, Urine, POC Negative   Bilirubin, Urine, POC Negative   Blood, UA POC Negative   URINE UROBILINOGEN POC 0.2   Nitrate, Urine, POC Negative   WBC, UA Negative   Performed by: Jose Shaw [JG]   1319 Urine hCG negative [JG]   1319 Troponin:    Troponin, High Sensitivity 3.1 [JG]   1428 Called lab to check on d-dimer that was has been in process for an hour. Was told it is should not be much longer [JG]   1444 D-Dimer, Quantitative:    D-Dimer, Quant 0.35 [JG]      ED Course User Index  [JG] AMANDA Ortiz        Voice dictation software was used during the making of this note. This software is not perfect and grammatical and other typographical errors may be present. This note has not been completely proofread for errors.       AMANDA Ortiz  09/13/22 [address] [facility] [place], [place]  09/13/22 [id number]

## 2022-09-13 NOTE — ED TRIAGE NOTES
Pt arrives via ems from home for chest pressure started this morning been going on for a while per ems. Reports normally has pain but today was pressure. Was given 324mg asa en route and 1 sl ntg.  Ekg NSR hr 80 bp 128/88 BGL 85 20G LAC No

## 2022-09-13 NOTE — ED NOTES
I have reviewed discharge instructions with the patient. The patient verbalized understanding. Patient left ED via Discharge Method: ambulatory to Home with significant other. Opportunity for questions and clarification provided. Patient given 0 scripts. To continue your aftercare when you leave the hospital, you may receive an automated call from our care team to check in on how you are doing. This is a free service and part of our promise to provide the best care and service to meet your aftercare needs.  If you have questions, or wish to unsubscribe from this service please call [phone]. Thank you for Choosing our [facility] Emergency Department.         Karina KumarWVU Medicine [facility]  09/13/22 7799

## 2022-11-21 NOTE — PROGRESS NOTES
[facility] History & Physical                 Reason for Visit: Chest pain    Subjective:     Patient is a 33 y.o. female who presents as a referral for chest pain. The patient visited the ED on September 14 with chest pain. Troponin was negative x2. Her D-dimer was normal.  The patient reports a \"flutter\" sensation in her chest that she has last felt in the last 3 days. She says that that sensation is often followed by a \"heartburn\". The patient does not report any alleviating or aggravating factors for the chest pain when it occurs. She denies hemoptysis. She describes the palpitations as a \"skipped beat\" and \"fast\" sensation. She reports RAMIERZ since Gall[place] my son\". Past Medical History:   Diagnosis Date    Anxiety     Chlamydia     Depression     medication 08/2017-Decemeber 2017. doing well off meds    Headache     Postpartum depression     Scoliosis       No past surgical history on file. Family History   Problem Relation Age of Onset    Hypertension Mother     Other Cousin         polydactyl    Bipolar Disorder Father     Cancer Maternal Grandfather         bone    Cancer Paternal Grandmother         cervical      Social History     Tobacco Use    Smoking status: Former     Packs/day: 0.25     Types: Cigarettes    Smokeless tobacco: Never    Tobacco comments:     Quit smoking: none since +pt   Substance Use Topics    Alcohol use: No      No Known Allergies      ROS:  No obvious pertinent positives on review of systems except for what was outlined above.        Objective:       /80   Pulse 80   Ht 5' 7\" (1.702 m)   Wt 200 lb 9.6 oz (91 kg)   BMI 31.42 kg/m²     BP Readings from Last 3 Encounters:   11/22/22 110/80   09/13/22 (!) 135/90   09/07/22 124/82       Wt Readings from Last 3 Encounters:   11/22/22 200 lb 9.6 oz (91 kg)   09/13/22 200 lb (90.7 kg)   09/07/22 200 lb 12.8 oz (91.1 kg)       General/Constitutional:   Alert and oriented x 3, no acute distress  HEENT: normocephalic, atraumatic, moist mucous membranes  Neck:   No JVD or carotid bruits bilaterally  Cardiovascular:   regular rate and rhythm, no rub/gallop appreciated  Pulmonary:   clear to auscultation bilaterally, no respiratory distress  Abdomen:   soft, non-tender, non-distended  Ext:   No sig LE edema bilaterally  Skin:  warm and dry, no obvious rashes seen  Neuro:   no obvious sensory or motor deficits  Psychiatric:   normal mood and affect      ECG:   Sinus rhythm  Right axis deviation  Heart rate 62 bpm    Data Review:   No results found for: CHOL  No results found for: TRIG  No results found for: HDL  No results found for: LDLCHOLESTEROL, LDLCALC  No results found for: LABVLDL, VLDL  No results found for: [facility]     Lab Results   Component Value Date/Time     09/13/2022 10:25 AM     07/27/2022 03:38 PM     06/29/2022 08:27 PM    K 4.2 09/13/2022 10:25 AM    K 4.1 07/27/2022 03:38 PM    K 3.8 06/29/2022 08:27 PM     09/13/2022 10:25 AM     07/27/2022 03:38 PM     06/29/2022 08:27 PM    CO2 24 09/13/2022 10:25 AM    CO2 24 07/27/2022 03:38 PM    CO2 22 06/29/2022 08:27 PM    BUN 17 09/13/2022 10:25 AM    BUN 4 07/27/2022 03:38 PM    BUN 8 06/29/2022 08:27 PM    CREATININE 0.70 09/13/2022 10:25 AM    CREATININE 0.60 07/27/2022 03:38 PM    CREATININE 0.53 06/29/2022 08:27 PM    GLUCOSE 95 09/13/2022 10:25 AM    GLUCOSE 81 07/27/2022 03:38 PM    GLUCOSE 97 06/29/2022 08:27 PM    CALCIUM 8.8 09/13/2022 10:25 AM    CALCIUM 8.5 07/27/2022 03:38 PM    CALCIUM 8.7 06/29/2022 08:27 PM         Lab Results   Component Value Date    ALT 22 09/13/2022    ALT 14 07/27/2022    ALT 20 06/29/2022    AST 20 09/13/2022    AST 15 07/27/2022    AST 23 06/29/2022        Assessment/Plan:   1. Atypical chest pain  - Symptoms are not consistent with typical angina  - Low clinical suspicion for pericarditis or aortic dissection  - PE unlikely per PE Wells score    2.  Palpitations  - Obtain a ZIO for 3 days     3.  RAMIREZ (dyspnea on exertion)  - Obtain an echocardiogram     F/U: As needed    Sylvia Fierro MD

## 2022-11-22 ENCOUNTER — OFFICE VISIT (OUTPATIENT)
Dept: OBGYN CLINIC | Age: 27
End: 2022-11-22
Payer: MEDICAID

## 2022-11-22 ENCOUNTER — INITIAL CONSULT (OUTPATIENT)
Dept: CARDIOLOGY CLINIC | Age: 27
End: 2022-11-22
Payer: MEDICAID

## 2022-11-22 VITALS
HEIGHT: 67 IN | SYSTOLIC BLOOD PRESSURE: 110 MMHG | DIASTOLIC BLOOD PRESSURE: 80 MMHG | BODY MASS INDEX: 31.48 KG/M2 | WEIGHT: 200.6 LBS | HEART RATE: 80 BPM

## 2022-11-22 VITALS — BODY MASS INDEX: 31.58 KG/M2 | DIASTOLIC BLOOD PRESSURE: 72 MMHG | WEIGHT: 201.6 LBS | SYSTOLIC BLOOD PRESSURE: 128 MMHG

## 2022-11-22 DIAGNOSIS — N89.8 VAGINAL IRRITATION: Primary | ICD-10-CM

## 2022-11-22 DIAGNOSIS — R10.2 PELVIC PAIN: ICD-10-CM

## 2022-11-22 DIAGNOSIS — R07.89 ATYPICAL CHEST PAIN: Primary | ICD-10-CM

## 2022-11-22 DIAGNOSIS — R06.09 DOE (DYSPNEA ON EXERTION): ICD-10-CM

## 2022-11-22 DIAGNOSIS — N89.8 VAGINAL DISCHARGE: ICD-10-CM

## 2022-11-22 DIAGNOSIS — R00.2 PALPITATIONS: ICD-10-CM

## 2022-11-22 DIAGNOSIS — N89.8 VAGINAL ODOR: ICD-10-CM

## 2022-11-22 PROCEDURE — 99213 OFFICE O/P EST LOW 20 MIN: CPT | Performed by: NURSE PRACTITIONER

## 2022-11-22 PROCEDURE — 99204 OFFICE O/P NEW MOD 45 MIN: CPT | Performed by: INTERNAL MEDICINE

## 2022-11-22 PROCEDURE — 93000 ELECTROCARDIOGRAM COMPLETE: CPT | Performed by: INTERNAL MEDICINE

## 2022-11-22 NOTE — PROGRESS NOTES
The patient is a 33 y.o. [id number] who is seen for having vaginal irritation. Pt thought she had a yeast infection so she tried the monistat which didn't really help. Pt states having a hx of BV. Pt states having an odor but denies any itching, just feels irritated. Pt states since having her son, she is having trouble exercising and day to day activities. Intermittent lower abdomen pain since delivery in July, had . \"Everything feels weak\" in reference to her pelvic floor. No urinary leakage or urinary sx such as dysuria/frequency/urgency. HISTORY:    [id number]  Patient's last menstrual period was 2022 (exact date). Sexual History:  has sex with males  Contraception:  none  Current Outpatient Medications on File Prior to Visit   Medication Sig Dispense Refill    sertraline (ZOLOFT) 50 MG tablet Take 50 mg by mouth daily      [DISCONTINUED] famotidine (PEPCID) 10 MG tablet Take 10 mg by mouth 2 times daily      Prenatal Vit w/Up-Drsvmuoff-YZ (PNV PO) Take by mouth      [DISCONTINUED] acetaminophen (TYLENOL) 325 MG tablet Take 1,000 mg by mouth every 4 hours as needed (Patient not taking: No sig reported)      [DISCONTINUED] calcium carbonate (OS-JORGE) 1250 (500 Ca) MG chewable tablet Take 1 tablet by mouth daily (Patient not taking: No sig reported)      [DISCONTINUED] progesterone (PROMETRIUM) 200 MG CAPS capsule Insert capsule nightly into vagina until 36 weeks (Patient not taking: No sig reported)       No current facility-administered medications on file prior to visit. ROS:  Feeling well. No dyspnea or chest pain on exertion. No abdominal pain, change in bowel habits, black or bloody stools. No urinary tract symptoms. GYN ROS: she complains of vaginal irritation, pelvic pain. PHYSICAL EXAM:  Blood pressure 128/72, weight 201 lb 9.6 oz (91.4 kg), last menstrual period 2022, not currently breastfeeding. The patient appears well, alert, oriented x 3, in no distress.   Lungs are clear. Heart RRR, no murmurs. Abdomen soft without tenderness, guarding, mass or organomegaly. Pelvic: VULVA: normal appearing vulva with no masses, tenderness or lesions, VAGINA: normal appearing vagina with normal color and discharge, no lesions, CERVIX: normal appearing cervix without discharge or lesions, UTERUS: uterus is normal size, shape, consistency and nontender, ADNEXA: normal adnexa in size, nontender and no masses. ASSESSMENT:  Encounter Diagnoses   Name Primary? Vaginal irritation Yes    Vaginal discharge     Pelvic pain     Vaginal odor        PLAN:  All questions answered  Differential reviewed  Counseled pt---check nuswab vag plus and will tx based on result. Rtc for US to eval gyn organs. If nl findings, can consider pelvic floor PT. Orders Placed This Encounter   Procedures    Nuswab Vaginitis Plus (VG+)     Standing Status:   Future     Number of Occurrences:   1     Standing Expiration Date:   11/22/2023           Supervising physician is Dr. Skylar Zepeda. Greater than 50% of the 20 minute visit were spent in counseling to the above topics.

## 2022-11-26 LAB
A VAGINAE DNA VAG QL NAA+PROBE: NORMAL SCORE
BVAB2 DNA VAG QL NAA+PROBE: NORMAL SCORE
C ALBICANS DNA VAG QL NAA+PROBE: NEGATIVE
C GLABRATA DNA VAG QL NAA+PROBE: NEGATIVE
C TRACH RRNA SPEC QL NAA+PROBE: NEGATIVE
MEGA1 DNA VAG QL NAA+PROBE: NORMAL SCORE
N GONORRHOEA RRNA SPEC QL NAA+PROBE: NEGATIVE
SPECIMEN SOURCE: NORMAL
T VAGINALIS RRNA SPEC QL NAA+PROBE: NEGATIVE

## 2022-12-07 ENCOUNTER — TELEPHONE (OUTPATIENT)
Dept: CARDIOLOGY CLINIC | Age: 27
End: 2022-12-07

## 2022-12-07 NOTE — TELEPHONE ENCOUNTER
----- Message from Ewa Ford MD sent at 12/6/2022  5:53 PM EST -----  Please let the patient know that the patient was predominantly in sinus rhythm. The patient had rare ectopy. No new changes to medical therapy at this time.

## 2023-01-17 NOTE — PROGRESS NOTES
The patient is a 33 y.o. [id number] who is seen for recheck and US. She began having irreg bleeding after starting patches, on the 1st day of patch use, was due for her period. Bleeding became prolonged, changed a pad every 1-2 hours. In total she bled for about 4 wks. She used the patch for 2 wks and removed it. Her bleeding stopped shortly thereafter. She has not struggled with irregular bleeding prior to patch use. Prior to patch use periods were monthly and regular lasting 5d. Was evaluated by ED, UPT neg and hgb 13.9  She is not bleeding. She has been on OCP in the past, thinks she did not like it, but is open to trying again. Ultrasound findings from today 1/18/23  GYN US performed secondary to irregular bleeding  CX-lining thickened fluid noted in canal, echogenic mass with blood flow, possible polyp measuring 0.5 x 0.4 x 0.7 cm, AL sliver of fluid with echogenic mass with blood flow, possible polyp measuring 0.4 x 0.3 x 0.4 cm, AL hypoechoic mass with blood flow, possible fibroid vs adenomyosis measuring 0.6 x 0.3 x 0.6 cm    Uterus is anteverted and heterogenous, C/W diffuse adenomyosis   Endo= 9.9 mm, debris is seen moving through canal, pt is not bleeding now, no intracavitary masses visualized. ROV visualized with follicles and simple appearing cyst.  LOV is visualized with follicles and paraovarian vs paratubal simple cyst (A paraovarian cyst was noted prev measuring 1.0 x 0.7 x 0.8 cm, hard to tell if the one on today's  scan is the same)   Bilateral aDN appear wnl with prominent bowel   Small amount of FF in PCDS, RT aDN and adjacent to LOV     HISTORY:    [id number]  Patient's last menstrual period was 12/24/2022 (exact date).   Sexual History:  has sex with males  Contraception:  none  Current Outpatient Medications on File Prior to Visit   Medication Sig Dispense Refill    Multiple Vitamins-Minerals (MULTIVITAMIN ADULT EXTRA C PO) Take by mouth      BL EVENING PRIMROSE OIL PO Take by mouth      sertraline (ZOLOFT) 50 MG tablet Take 50 mg by mouth daily      Prenatal Vit w/Vp-Pkytpjzpl-CE (PNV PO) Take by mouth      [DISCONTINUED] famotidine (PEPCID) 10 MG tablet Take 10 mg by mouth 2 times daily      [DISCONTINUED] acetaminophen (TYLENOL) 325 MG tablet Take 1,000 mg by mouth every 4 hours as needed (Patient not taking: No sig reported)      [DISCONTINUED] calcium carbonate (OS-JORGE) 1250 (500 Ca) MG chewable tablet Take 1 tablet by mouth daily (Patient not taking: No sig reported)      [DISCONTINUED] progesterone (PROMETRIUM) 200 MG CAPS capsule Insert capsule nightly into vagina until 36 weeks (Patient not taking: No sig reported)       No current facility-administered medications on file prior to visit. ROS:  Feeling well. No dyspnea or chest pain on exertion. No abdominal pain, change in bowel habits, black or bloody stools. No urinary tract symptoms. GYN ROS: she complains of BTB with patch. PHYSICAL EXAM:  Blood pressure 124/72, height 5' 7\" (1.702 m), weight 204 lb 9.6 oz (92.8 kg), last menstrual period 12/24/2022, not currently breastfeeding. The patient appears well, alert, oriented x 3, in no distress. Exam deferred, US only    ASSESSMENT:  Encounter Diagnosis   Name Primary? Irregular bleeding Yes       PLAN:  All questions answered  Diagnosis explained in detail, including differential  Lengthy US review with pt. Disc possible adenomyosis and/or small polyps. Pt has never had any prior problems with prolonged, irregular or intermenstrual bleeding or PCB. Polyps likely not r/t prolonged bleeding on BC patch, likely r/t BTB. PT wishes to restart OCP. Start junel 1/20  Counseled pt on OCP use. Discussed SE to include BTB, nausea, weight gain and risks to include VTE, stroke, cardiac event. Pt with no contraindications to OCPs.        Orders Placed This Encounter   Procedures    AMB POC US, TRANSVAGINAL     Order Specific Question:   Reason for Exam:     Answer: GYN US     Order Specific Question:   Are you Pregnant? Answer:   No         Supervising physician is Dr. Yeny Jain. Greater than 50% of the 20 minute visit were spent in counseling to the above topics.

## 2023-01-18 ENCOUNTER — OFFICE VISIT (OUTPATIENT)
Dept: OBGYN CLINIC | Age: 28
End: 2023-01-18
Payer: MEDICAID

## 2023-01-18 ENCOUNTER — PROCEDURE VISIT (OUTPATIENT)
Dept: OBGYN CLINIC | Age: 28
End: 2023-01-18

## 2023-01-18 VITALS — BODY MASS INDEX: 31.58 KG/M2 | HEIGHT: 67 IN

## 2023-01-18 VITALS
BODY MASS INDEX: 32.11 KG/M2 | WEIGHT: 204.6 LBS | SYSTOLIC BLOOD PRESSURE: 124 MMHG | HEIGHT: 67 IN | DIASTOLIC BLOOD PRESSURE: 72 MMHG

## 2023-01-18 DIAGNOSIS — N93.9 ABNORMAL UTERINE BLEEDING (AUB): Primary | ICD-10-CM

## 2023-01-18 DIAGNOSIS — N92.6 IRREGULAR BLEEDING: Primary | ICD-10-CM

## 2023-01-18 PROCEDURE — 99213 OFFICE O/P EST LOW 20 MIN: CPT | Performed by: NURSE PRACTITIONER

## 2023-01-18 PROCEDURE — 76830 TRANSVAGINAL US NON-OB: CPT | Performed by: NURSE PRACTITIONER

## 2023-01-18 RX ORDER — NORETHINDRONE ACETATE AND ETHINYL ESTRADIOL 1MG-20(21)
1 KIT ORAL DAILY
Qty: 1 PACKET | Refills: 11 | Status: SHIPPED | OUTPATIENT
Start: 2023-01-18

## 2023-01-25 NOTE — PROGRESS NOTES
The patient is a 33 y.o. [id number] who is seen for c/o irritation. Pt states she thinks it could have been the condom, new soap, or shaving. Pt states she had intercourse about 3 days ago. Pt is having dysuria after intercourse. UA in office unremarkable. Pt uses Dr. Rafaela Purvis she said she has been using monistat boric acid wash, and while taking a bath it was burning. Pt states she has only used condoms about 4x while being sexually active. Pts mom is allergic to latex so pt thinks she might also be. PT requests STI screen    HISTORY:    [id number]  Patient's last menstrual period was 12/24/2022 (exact date). Sexual History:  has sex with males  Contraception:  condoms  Current Outpatient Medications on File Prior to Visit   Medication Sig Dispense Refill    Multiple Vitamins-Minerals (MULTIVITAMIN ADULT EXTRA C PO) Take by mouth      BL EVENING PRIMROSE OIL PO Take by mouth      sertraline (ZOLOFT) 50 MG tablet Take 50 mg by mouth daily      Prenatal Vit w/Sq-Vtvnaeqbn-IP (PNV PO) Take by mouth      norethindrone-ethinyl estradiol (LOESTRIN FE 1/20) 1-20 MG-MCG per tablet Take 1 tablet by mouth daily (Patient not taking: Reported on 1/26/2023) 1 packet 11    [DISCONTINUED] famotidine (PEPCID) 10 MG tablet Take 10 mg by mouth 2 times daily      [DISCONTINUED] acetaminophen (TYLENOL) 325 MG tablet Take 1,000 mg by mouth every 4 hours as needed (Patient not taking: No sig reported)      [DISCONTINUED] calcium carbonate (OS-JORGE) 1250 (500 Ca) MG chewable tablet Take 1 tablet by mouth daily (Patient not taking: No sig reported)      [DISCONTINUED] progesterone (PROMETRIUM) 200 MG CAPS capsule Insert capsule nightly into vagina until 36 weeks (Patient not taking: No sig reported)       No current facility-administered medications on file prior to visit. ROS:  Feeling well. No dyspnea or chest pain on exertion. No abdominal pain, change in bowel habits, black or bloody stools.   No urinary tract symptoms. GYN ROS: she complains of vaginal irritation. PHYSICAL EXAM:  Blood pressure 124/72, height 5' 7\" (1.702 m), weight 202 lb 9.6 oz (91.9 kg), last menstrual period 12/24/2022, not currently breastfeeding. The patient appears well, alert, oriented x 3, in no distress. Pelvic: VULVA: normal appearing vulva with no masses, tenderness or lesions, VAGINA: normal appearing vagina with normal color and discharge, no lesions, CERVIX: normal appearing cervix without discharge or lesions    Pt reports irritation primarily at posterior fourchette and at vestibule. No lesions ulcerations swelling or erythema noted. ASSESSMENT:  Encounter Diagnoses   Name Primary? Painful urination Yes    Vaginal pain        PLAN:  All questions answered  Diagnosis explained in detail, including differential  Suspect irritation possibly dermatitis from soap/condom use   Rec avoid soap in vaginal area. Nuswab vaginitis plus taken  Check urine cx to r/o uti as she c/o dysuria, however suspect this is from urine touching irritated tissues. Mycolog applied for external comfort bid x1-2 wk    Orders Placed This Encounter   Procedures    Culture, Urine     Standing Status:   Future     Number of Occurrences:   1     Standing Expiration Date:   1/26/2024     Order Specific Question:   Specify (ex-cath, midstream, cysto, etc)? Answer:   midstream    Nuswab Vaginitis Plus (VG+)     Standing Status:   Future     Number of Occurrences:   1     Standing Expiration Date:   1/26/2024    AMB POC URINALYSIS DIP STICK AUTO W/O MICRO         Supervising physician is Dr. Yari Anderson.   20 min chart review, exam, counseling and documetnation

## 2023-01-26 ENCOUNTER — OFFICE VISIT (OUTPATIENT)
Dept: OBGYN CLINIC | Age: 28
End: 2023-01-26
Payer: MEDICAID

## 2023-01-26 VITALS
DIASTOLIC BLOOD PRESSURE: 72 MMHG | WEIGHT: 202.6 LBS | BODY MASS INDEX: 31.8 KG/M2 | HEIGHT: 67 IN | SYSTOLIC BLOOD PRESSURE: 124 MMHG

## 2023-01-26 DIAGNOSIS — R30.9 PAINFUL URINATION: Primary | ICD-10-CM

## 2023-01-26 DIAGNOSIS — R10.2 VAGINAL PAIN: ICD-10-CM

## 2023-01-26 LAB
BILIRUBIN, URINE, POC: NEGATIVE
BLOOD URINE, POC: NEGATIVE
GLUCOSE URINE, POC: NEGATIVE
KETONES, URINE, POC: NEGATIVE
LEUKOCYTE ESTERASE, URINE, POC: NEGATIVE
NITRITE, URINE, POC: NEGATIVE
PH, URINE, POC: 5.5 (ref 4.6–8)
PROTEIN,URINE, POC: NEGATIVE
SPECIFIC GRAVITY, URINE, POC: 1.03 (ref 1–1.03)
URINALYSIS CLARITY, POC: CLEAR
URINALYSIS COLOR, POC: YELLOW
UROBILINOGEN, POC: NORMAL

## 2023-01-26 PROCEDURE — 81003 URINALYSIS AUTO W/O SCOPE: CPT | Performed by: NURSE PRACTITIONER

## 2023-01-26 PROCEDURE — 99214 OFFICE O/P EST MOD 30 MIN: CPT | Performed by: NURSE PRACTITIONER

## 2023-01-29 LAB
A VAGINAE DNA VAG QL NAA+PROBE: NORMAL SCORE
BACTERIA SPEC CULT: NORMAL
BVAB2 DNA VAG QL NAA+PROBE: NORMAL SCORE
C ALBICANS DNA VAG QL NAA+PROBE: NEGATIVE
C GLABRATA DNA VAG QL NAA+PROBE: NEGATIVE
C TRACH RRNA SPEC QL NAA+PROBE: NEGATIVE
MEGA1 DNA VAG QL NAA+PROBE: NORMAL SCORE
N GONORRHOEA RRNA SPEC QL NAA+PROBE: NEGATIVE
SERVICE CMNT-IMP: NORMAL
SPECIMEN SOURCE: NORMAL
T VAGINALIS RRNA SPEC QL NAA+PROBE: NEGATIVE

## 2023-02-13 NOTE — Clinical Note
[address] [facility] EMERGENCY DEPT   [facility] [address] [phone]    Work/School Note    Date: 1/2/2022    To Whom It May concern:    Reg Vega was seen and treated today in the emergency room by the following provider(s):  Attending Provider: Yumiko Valadez DO  Physician Assistant: CHELA Awad. Reg Vega is excused from work/school on 01/02/22 and 01/03/22. She is medically clear to return to work/school on 1/4/2022.        Sincerely,          CHELA Zaman no

## 2023-03-02 ENCOUNTER — TELEPHONE (OUTPATIENT)
Dept: OBGYN CLINIC | Age: 28
End: 2023-03-02
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
## 2023-03-02 NOTE — TELEPHONE ENCOUNTER
Have tried to call pt several times and sent [employer] message and have not heard back from pt.

## 2023-03-06 ENCOUNTER — HOSPITAL ENCOUNTER (OUTPATIENT)
Dept: PHYSICAL THERAPY | Age: 28
Setting detail: RECURRING SERIES
Discharge: HOME | End: 2023-03-09
Payer: MEDICAID

## 2023-03-06 PROCEDURE — 97162 PT EVAL MOD COMPLEX 30 MIN: CPT

## 2023-03-06 NOTE — PROGRESS NOTES
Stella Arceo  : 1995  Primary: [employer] (Medicaid Managed)  Secondary:  Guillermo Ramos  [address] [address]  Phone: [phone]  Fax: [phone] Plan Frequency: up to 2x's per week until reassessment, goals attained, or discharged    Plan of Care/Certification Expiration Date: 23      PT Visit Info:  Plan Frequency: up to 2x's per week until reassessment, goals attained, or discharged  Plan of Care/Certification Expiration Date: 23  Total # of Visits to Date: 1  Progress Note Counter: 1      Visit Count:  1    OUTPATIENT PHYSICAL THERAPY:OP NOTE TYPE: Treatment Note 3/6/2023       Episode  }Appt Desk             Treatment Diagnosis:  Cervicalgia (M54.2)  Medical/Referring Diagnosis:  Cervicalgia [M54.2]  Referring Physician:  MARJORIE Peña NP, MD Orders:  PT Eval and Treat   Date of Onset:  Onset Date:  ( chronic)     Allergies:   Patient has no known allergies. Restrictions/Precautions:  Restrictions/Precautions: None  No data recorded     Interventions Planned (Treatment may consist of any combination of the following):    Current Treatment Recommendations: Strengthening; ROM; Functional mobility training; Transfer training; Balance training; Endurance training; Cognitive/Perceptual training; ADL/Self-care training; Gait training; Stair training; Neuromuscular re-education; Manual; Pain management; Return to work related activity; Home exercise program; Safety education & training; Patient/Caregiver education & training; Equipment evaluation, education, & procurement; Modalities;  Positioning; Dry needling     Subjective Comments:     Initial:}  0   /10Post Session:       0 /10  Medications Last Reviewed:  3/6/2023  Updated Objective Findings:   See evaluation note from 23  Treatment   THERAPEUTIC EXERCISE: (0 minutes):    Exercises per grid below to improve mobility, strength, and coordination. Required minimal verbal and tactile cues to promote proper body alignment, promote proper body posture, and promote proper body mechanics. Progressed resistance, range, repetitions, and complexity of movement as indicated. Date:  03-06-23 Date:   Date:     Activity/Exercise Parameters Parameters Parameters   UT stretch      Scapular retraction                                        Treatment/Session Summary:    Treatment Assessment:     Communication/Consultation:  Spoke with pt in regards to pt condition. Equipment provided today:  n/a  Recommendations/Intent for next treatment session: Next visit will focus on cervical mobility and strength. Total Treatment Billable Duration:  45 minutes EVAL ONLY  Time In: 3762  Time Out: [address]       Charge Capture  }Post Session Pain  PT Visit Info  [facility] Portal  MD Guidelines  Scanned Media  Benefits  MyChart    No future appointments.

## 2023-03-13 ENCOUNTER — HOSPITAL ENCOUNTER (OUTPATIENT)
Dept: PHYSICAL THERAPY | Age: 28
Setting detail: RECURRING SERIES
Discharge: HOME | End: 2023-03-16
Payer: MEDICAID

## 2023-03-13 PROCEDURE — 97140 MANUAL THERAPY 1/> REGIONS: CPT

## 2023-03-13 PROCEDURE — 97110 THERAPEUTIC EXERCISES: CPT

## 2023-03-13 NOTE — PROGRESS NOTES
Prashanth Arceo  : 1995  Primary: [employer] Medicaid (Medicaid Managed)  Secondary:  Fang Mcmillan  [address] [address] [address]  Phone: [phone]  Fax: [phone] Plan Frequency: up to 2x's per week until reassessment, goals attained, or discharged    Plan of Care/Certification Expiration Date: 23      PT Visit Info:  Plan Frequency: up to 2x's per week until reassessment, goals attained, or discharged  Plan of Care/Certification Expiration Date: 23  Total # of Visits to Date: 2  Progress Note Counter: 2      Visit Count:  2    OUTPATIENT PHYSICAL THERAPY:OP NOTE TYPE: Treatment Note 3/13/2023       Episode  }Appt Desk             Treatment Diagnosis:  Cervicalgia (M54.2)  Medical/Referring Diagnosis:  Cervicalgia [M54.2]  Referring Physician:  MARJORIE Reis NP, MD Orders:  PT Eval and Treat   Date of Onset:  Onset Date:  ( chronic)     Allergies:   Patient has no known allergies. Restrictions/Precautions:  Restrictions/Precautions: None  No data recorded     Interventions Planned (Treatment may consist of any combination of the following):    Current Treatment Recommendations: Strengthening; ROM; Functional mobility training; Transfer training; Balance training; Endurance training; Cognitive/Perceptual training; ADL/Self-care training; Gait training; Stair training; Neuromuscular re-education; Manual; Pain management; Return to work related activity; Home exercise program; Safety education & training; Patient/Caregiver education & training; Equipment evaluation, education, & procurement; Modalities; Positioning; Dry needling     Subjective Comments:  Pain in general has been getting worse. Increased stiffnes and tightness all over. Getting blood work done soon.   Initial:}  5   /10 tightness and stiff all around Post Session:       4 /10  Medications Last Reviewed:  3/13/2023  Updated Objective Findings:   none today  Treatment   THERAPEUTIC EXERCISE: (25 minutes):    Exercises per grid below to improve mobility, strength, and coordination. Required minimal verbal and tactile cues to promote proper body alignment, promote proper body posture, and promote proper body mechanics. Progressed resistance, range, repetitions, and complexity of movement as indicated. Date:  03-06-23 Date:   Date:     Activity/Exercise Parameters Parameters Parameters   UBE      Open book X 10 B     Chin tucks X 10      UT and levator stretch HEP     Lat pulldown /row  2 x 10 B; OTB     Prone row  2 x 10; B 3#     Banded core rotations X 10 B; OTB     Ball roll up wall X 10; red ball                 MANUAL THERAPY: (15 minutes):   Joint mobilization and Soft tissue mobilization was utilized and necessary because of the patient's restricted joint motion, painful spasm, loss of articular motion, and restricted motion of soft tissue.                     -Cervical mobilization (C3-C6) PA and UPA mob to decrease cervical stiffness and improve range of motion   -STM of UT, rhomboids, levator scap in prone   -Thoracic central PA mobilization (grade I-III) to decrease pain and tenderness  Treatment/Session Summary:    Treatment Assessment: Patient presents with cervicalgia and mid back stiffness with occasional numbness and tingling in the hands and feet. Presents with increased pain and tenderness C5-C6 which improved following grade I-II central PA. Manual therapy and exercise showed improvements in upper back and cervical mobility. Continue to work on postural strengthening and cervical mobility/strengthening exercises. Communication/Consultation:  Spoke with pt in regards to pt condition. Equipment provided today:  n/a  Recommendations/Intent for next treatment session: Next visit will focus on cervical mobility and strength.     Total Treatment Billable Duration:  40 minutes   Time In: 4002  Time Out: 4425 TILE Financial Charge Capture  }Post Session Pain  PT Visit Info  [employer]  MD Guidelines  Scanned Media  Benefits  MyChart    Future Appointments   Date Time Provider Pal Erin   3/15/2023  8:45 AM Mukund Colón, PT [facility]

## 2023-03-15 ENCOUNTER — HOSPITAL ENCOUNTER (OUTPATIENT)
Dept: PHYSICAL THERAPY | Age: 28
Setting detail: RECURRING SERIES
Discharge: HOME | End: 2023-03-18
Payer: MEDICAID

## 2023-03-15 PROCEDURE — 97140 MANUAL THERAPY 1/> REGIONS: CPT

## 2023-03-15 PROCEDURE — 97110 THERAPEUTIC EXERCISES: CPT

## 2023-03-15 NOTE — PROGRESS NOTES
Pat Kimberlyminnie Arceo  : 1995  Primary: [employer] (Medicaid Managed)  Secondary:  Mango Honeycutt  [address]  [place]irae [facility] [address]  Phone: [phone]  Fax: [phone] Plan Frequency: up to 2x's per week until reassessment, goals attained, or discharged    Plan of Care/Certification Expiration Date: 23      PT Visit Info:  Plan Frequency: up to 2x's per week until reassessment, goals attained, or discharged  Plan of Care/Certification Expiration Date: 23  Total # of Visits to Date: 3  Progress Note Counter: 3      Visit Count:  3    OUTPATIENT PHYSICAL THERAPY:OP NOTE TYPE: Treatment Note 3/15/2023       Episode  }Appt Desk             Treatment Diagnosis:  Cervicalgia (M54.2)  Medical/Referring Diagnosis:  Cervicalgia [M54.2]  Referring Physician:  MARJORIE Landry NP, MD Orders:  PT Eval and Treat   Date of Onset:  Onset Date:  ( chronic)     Allergies:   Patient has no known allergies. Restrictions/Precautions:  Restrictions/Precautions: None  No data recorded     Interventions Planned (Treatment may consist of any combination of the following):    Current Treatment Recommendations: Strengthening; ROM; Functional mobility training; Transfer training; Balance training; Endurance training; Cognitive/Perceptual training; ADL/Self-care training; Gait training; Stair training; Neuromuscular re-education; Manual; Pain management; Return to work related activity; Home exercise program; Safety education & training; Patient/Caregiver education & training; Equipment evaluation, education, & procurement; Modalities;  Positioning; Dry needling     Subjective Comments:  no pain right now but last night R shoulder was hurting a little because I did a lot the day before  Initial:}  0   /10 tightness Post Session:       0 /10  Medications Last Reviewed:  3/15/2023  Updated Objective Findings:   none today  Treatment THERAPEUTIC EXERCISE: (30 minutes):    Exercises per grid below to improve mobility, strength, and coordination. Required minimal verbal and tactile cues to promote proper body alignment, promote proper body posture, and promote proper body mechanics. Progressed resistance, range, repetitions, and complexity of movement as indicated. Date:  03-06-23 Date:  03-15-23 Date:     Activity/Exercise Parameters Parameters Parameters   UBE      Open book X 10 B X 8 B     Supine- Chin tucks   X 10 B    Supine- Chin tucks rotations X 10      UT and levator stretch HEP     Lat pulldown /row  2 x 10 B; OTB 2 x 10 B; OTB    Prone row  2 x 10; B 3#     IYT  X 10 B    Banded core rotations X 10 B; OTB     Ball roll up wall X 10; red ball                 MANUAL THERAPY: (10 minutes):   Joint mobilization and Soft tissue mobilization was utilized and necessary because of the patient's restricted joint motion, painful spasm, loss of articular motion, and restricted motion of soft tissue.                     -Cervical mobilization (C3-C6) PA and UPA mob to decrease cervical stiffness and improve range of motion   -STM of UT, rhomboids, levator scap in prone   -Thoracic central PA mobilization (grade I-III) to decrease pain and tenderness  Treatment/Session Summary:    Treatment Assessment: Patient presents with cervicalgia and mid back stiffness with occasional numbness and tingling in the hands. She reports the tingling has decreased in her feet lately. Presents with increased tenderness [id number] improved following grade I-II central PA. Manual therapy and exercise showed improvements in upper back and cervical mobility. Provide information on number of visits needed for MRI request next visit. Continue to work on postural strengthening and cervical mobility/strengthening exercises. Communication/Consultation:  Spoke with pt in regards to pt condition.   Equipment provided today:  n/a  Recommendations/Intent for next treatment session: Next visit will focus on cervical and upper thoracic coordination and strength.     Total Treatment Billable Duration:  40 minutes   Time In: 9567  Time Out: 1125 [address] Milo Brian Kramer       Charge Capture  }Post Session Pain  PT Visit Info  [employer] Portal  MD Guidelines  Scanned Media  Benefits  MyChart    Future Appointments   Date Time Provider Pal Khan   3/22/2023  8:45 AM Jonathan Ritter,[address] [facility]   3/24/2023  8:45 AM Ady Burger [facility]

## 2023-03-22 ENCOUNTER — HOSPITAL ENCOUNTER (OUTPATIENT)
Dept: PHYSICAL THERAPY | Age: 28
Setting detail: RECURRING SERIES
End: 2023-03-22
Payer: MEDICAID

## 2023-03-22 NOTE — PROGRESS NOTES
Pablogaye Arceo  : 1995  Primary: [employer] Medicaid  Secondary:  Ki Emmanuel  [address]  [address]  Phone: [phone]  Fax: [phone] Plan Frequency: up to 2x's per week until reassessment, goals attained, or discharged    Plan of Care/Certification Expiration Date: 23      PT Visit Info: Total # of Visits to Date: 3  Progress Note Counter: 3  Canceled Appointment: 1         OUTPATIENT PHYSICAL THERAPY 3/22/2023     Appt Desk   Episode   MyChart      Ms. Monty Woodall was a same-day cancellation for today's appointment.      Cancellation Number: [address] [facility], PT    Future Appointments   Date Time Provider Pal Khan   3/24/2023  8:45 AM Skip Drought, GUSTAVO [facility]

## 2023-03-24 ENCOUNTER — HOSPITAL ENCOUNTER (OUTPATIENT)
Dept: PHYSICAL THERAPY | Age: 28
Setting detail: RECURRING SERIES
End: 2023-03-24
Payer: MEDICAID

## 2023-03-24 NOTE — PROGRESS NOTES
Stella Arceo  : 1995  Primary: [employer] Medicaid  Secondary:  Guillermo Ramos  [address]  [address]  Phone: [phone]  Fax: [phone] Plan Frequency: up to 2x's per week until reassessment, goals attained, or discharged    Plan of Care/Certification Expiration Date: 23      PT Visit Info: Total # of Visits to Date: 3  Progress Note Counter: 3  Canceled Appointment: 2         OUTPATIENT PHYSICAL THERAPY 3/24/2023     Appt Desk   Episode   MyChart      Ms. Jorge Laguna was a same-day cancellation for today's appointment. Patient reporting sickness at home (herself/children).      Cancellation Number: 2     Ez Cheng, [facility]    Future Appointments   Date Time Provider Pal Khan   3/27/2023  9:30 AM zE Cheng, [facility]   3/29/2023  8:45 AM Fili Escobar, BRISEIDA [facility]

## 2023-03-27 ENCOUNTER — HOSPITAL ENCOUNTER (OUTPATIENT)
Dept: PHYSICAL THERAPY | Age: 28
Setting detail: RECURRING SERIES
Discharge: HOME | End: 2023-03-30
Payer: MEDICAID

## 2023-03-27 PROCEDURE — 97140 MANUAL THERAPY 1/> REGIONS: CPT

## 2023-03-27 PROCEDURE — 97110 THERAPEUTIC EXERCISES: CPT

## 2023-03-27 ASSESSMENT — PAIN SCALES - GENERAL: PAINLEVEL_OUTOF10: 0

## 2023-03-27 NOTE — PROGRESS NOTES
mobility/strengthening exercises. Communication/Consultation:  Spoke with pt in regards to pt condition. Equipment provided today:  n/a  Recommendations/Intent for next treatment session: Next visit will focus on cervical and upper thoracic coordination and strength.     Total Treatment Billable Duration:  45 minutes   Time In: 0930  Time Out: [address], PTA       Charge Capture  }Post Session Pain  PT Visit Info  [employer] Portal  MD Guidelines  Scanned Media  Benefits  MyChart    Future Appointments   Date Time Provider Pal Khan   3/29/2023  8:45 AM Arabella Boo [facility]

## 2023-03-29 ENCOUNTER — HOSPITAL ENCOUNTER (OUTPATIENT)
Dept: PHYSICAL THERAPY | Age: 28
Setting detail: RECURRING SERIES
Discharge: HOME | End: 2023-04-01
Payer: MEDICAID

## 2023-03-29 PROCEDURE — 97110 THERAPEUTIC EXERCISES: CPT

## 2023-03-29 NOTE — PROGRESS NOTES
today  Treatment   THERAPEUTIC EXERCISE: (30 minutes):    Exercises per grid below to improve mobility, strength, and coordination. Required minimal verbal and tactile cues to promote proper body alignment, promote proper body posture, and promote proper body mechanics. Progressed resistance, range, repetitions, and complexity of movement as indicated. Date:  03-06-23 Date:  03-15-23 Date:  3-27-23 Date:  03-29-23   Activity/Exercise Parameters Parameters Parameters    UBE   Level 2  4/4     Open book X 10 B X 8 B  X 10 B X 10 B   Supine- Chin tucks   X 10 B     Supine- Chin tucks rotations X 10       UT and levator stretch HEP      Side lying shoulder horizontal abduction/shoulder abduction    X 10 B; #3  Each direction    Lat pulldown /row  2 x 10 B; OTB 2 x 10 B; OTB Rows  OTB  2 x 10 B   Pull downs  GTB 2 x 10 B Rows  2 x 10 B; BTB  Pull down   2 x 10 B; GTB    Prone row  2 x 10; B 3#   2 x 10 B; #7   YT  X 10 B  X 10 B; #1   Banded core rotations X 10 B; OTB      Ball roll up wall X 10; red ball  X 10   End range stretch x 5 each side     Banded ER retraction    2 x 10 B; GTB  HEP          MANUAL THERAPY: (0 minutes):   Joint mobilization and Soft tissue mobilization was utilized and necessary because of the patient's restricted joint motion, painful spasm, loss of articular motion, and restricted motion of soft tissue.                     -Cervical mobilization (C3-C6) PA and UPA mob to decrease cervical stiffness and improve range of motion    -STM of UT, rhomboids, levator scap  in prone    -Thoracic central PA mobilization (grade I-III) to decrease pain and tenderness   Treatment/Session Summary:    Treatment Assessment:   Pt performed all postural strengthening/coordination exercises well today with only slight numbness and tingling with prone T's that went away with rest. Provided banded ER retraction exercise and green theraband for HEP. CPOC.   Communication/Consultation:  Spoke with pt in regards to

## 2023-04-04 ENCOUNTER — HOSPITAL ENCOUNTER (OUTPATIENT)
Dept: PHYSICAL THERAPY | Age: 28
Setting detail: RECURRING SERIES
Discharge: HOME | End: 2023-04-07
Payer: MEDICAID

## 2023-04-04 PROCEDURE — 97140 MANUAL THERAPY 1/> REGIONS: CPT

## 2023-04-04 PROCEDURE — 97110 THERAPEUTIC EXERCISES: CPT

## 2023-04-04 ASSESSMENT — PAIN SCALES - GENERAL: PAINLEVEL_OUTOF10: 4

## 2023-04-04 NOTE — PROGRESS NOTES
Jeancarlos Arceo  : 1995  Primary: [employer] (Medicaid Managed)  Secondary:  Jhony Yepez  [address]  [place] [address]  Phone: [phone]  Fax: [phone] Plan Frequency: up to 2x's per week until reassessment, goals attained, or discharged    Plan of Care/Certification Expiration Date: 23      PT Visit Info:  Plan Frequency: up to 2x's per week until reassessment, goals attained, or discharged  Plan of Care/Certification Expiration Date: 23  Total # of Visits to Date: 6  Progress Note Counter: 6  Canceled Appointment: 2      Visit Count:  6    OUTPATIENT PHYSICAL THERAPY:OP NOTE TYPE: Treatment Note 2023       Episode  }Appt Desk             Treatment Diagnosis:  Cervicalgia (M54.2)  Medical/Referring Diagnosis:  Cervicalgia [M54.2]  Referring Physician:  MARJORIE Cano NP, MD Orders:  PT Eval and Treat   Date of Onset:  Onset Date:  ( chronic)     Allergies:   Patient has no known allergies. Restrictions/Precautions:  Restrictions/Precautions: None  No data recorded     Interventions Planned (Treatment may consist of any combination of the following):    Current Treatment Recommendations: Strengthening; ROM; Functional mobility training; Transfer training; Balance training; Endurance training; Cognitive/Perceptual training; ADL/Self-care training; Gait training; Stair training; Neuromuscular re-education; Manual; Pain management; Return to work related activity; Home exercise program; Safety education & training; Patient/Caregiver education & training; Equipment evaluation, education, & procurement; Modalities; Positioning; Dry needling     Subjective Comments:  When I don't use my muscles the neck hurts ! Initial:}    4/10 tightness Post Session:       3/10 improved tightness in the neck.      Medications Last Reviewed:  2023  Updated Objective Findings:   none today  Treatment

## 2023-04-10 ENCOUNTER — HOSPITAL ENCOUNTER (OUTPATIENT)
Dept: PHYSICAL THERAPY | Age: 28
Setting detail: RECURRING SERIES
End: 2023-04-10
Payer: MEDICAID

## 2023-04-18 ENCOUNTER — HOSPITAL ENCOUNTER (OUTPATIENT)
Dept: PHYSICAL THERAPY | Age: 28
Setting detail: RECURRING SERIES
Discharge: HOME | End: 2023-04-21
Payer: MEDICAID

## 2023-04-18 PROCEDURE — 97140 MANUAL THERAPY 1/> REGIONS: CPT

## 2023-04-18 PROCEDURE — 97110 THERAPEUTIC EXERCISES: CPT

## 2023-04-18 ASSESSMENT — PAIN SCALES - GENERAL: PAINLEVEL_OUTOF10: 0

## 2023-04-18 NOTE — PROGRESS NOTES
Ladarius Arceo  : 1995  Primary: [employer] Medicaid (Medicaid Managed)  Secondary:  Ed Saljeanna  [address]  [place] [facility] [address]  Phone: [phone]  Fax: [phone] Plan Frequency: up to 2x's per week until reassessment, goals attained, or discharged    Plan of Care/Certification Expiration Date: 23      PT Visit Info:  Plan Frequency: up to 2x's per week until reassessment, goals attained, or discharged  Plan of Care/Certification Expiration Date: 23  Total # of Visits to Date: 7  Progress Note Counter: 7  No Show: 1  Canceled Appointment: 2      Visit Count:  7    OUTPATIENT PHYSICAL THERAPY:OP NOTE TYPE: Treatment Note 2023       Episode  }Appt Desk             Treatment Diagnosis:  Cervicalgia (M54.2)  Medical/Referring Diagnosis:  Cervicalgia [M54.2]  Referring Physician:  MARJORIE Pina NP, MD Orders:  PT Eval and Treat   Date of Onset:  Onset Date:  ( chronic)     Allergies:   Patient has no known allergies. Restrictions/Precautions:  Restrictions/Precautions: None  No data recorded     Interventions Planned (Treatment may consist of any combination of the following):    Current Treatment Recommendations: Strengthening; ROM; Functional mobility training; Transfer training; Balance training; Endurance training; Cognitive/Perceptual training; ADL/Self-care training; Gait training; Stair training; Neuromuscular re-education; Manual; Pain management; Return to work related activity; Home exercise program; Safety education & training; Patient/Caregiver education & training; Equipment evaluation, education, & procurement; Modalities; Positioning; Dry needling     Subjective Comments:  Patient apologized for being late. She reports she is trying to figure out cause of her son's respiratory issues in her home.   Initial:}    0/10  Post Session:       0/10      Medications Last Reviewed:

## 2023-04-24 ENCOUNTER — HOSPITAL ENCOUNTER (OUTPATIENT)
Dept: PHYSICAL THERAPY | Age: 28
Setting detail: RECURRING SERIES
End: 2023-04-24
Payer: MEDICAID

## 2023-04-28 DIAGNOSIS — Z30.45 ENCOUNTER FOR SURVEILLANCE OF TRANSDERMAL PATCH HORMONAL CONTRACEPTIVE DEVICE: Primary | ICD-10-CM

## 2023-05-16 ENCOUNTER — HOSPITAL ENCOUNTER (OUTPATIENT)
Dept: PHYSICAL THERAPY | Age: 28
Setting detail: RECURRING SERIES
Discharge: HOME | End: 2023-05-19
Payer: MEDICAID

## 2023-05-16 PROCEDURE — 97016 VASOPNEUMATIC DEVICE THERAPY: CPT

## 2023-05-16 PROCEDURE — 97110 THERAPEUTIC EXERCISES: CPT

## 2023-05-16 ASSESSMENT — PAIN SCALES - GENERAL: PAINLEVEL_OUTOF10: 5

## 2023-05-16 NOTE — PROGRESS NOTES
the MRI. Patient to call us when she gets another appointment. Communication/Consultation:  Spoke with pt in regards to pt condition. Equipment provided today:  n/a  Recommendations/Intent for next treatment session: Next visit will focus on cervical and upper thoracic coordination and strength, extensor mobility and strengthening. Total Treatment Billable Duration:   44 minutes + 15 minutes vaso = 59 minutes   Time In: 0176 (Patient 9 minutes late)  Time Out: 0078    Dalyvivian Quijano PTA       Charge Capture  }Post Session Pain  PT Visit Info  [employer] Portal  MD Guidelines  Scanned Media  Benefits  MyChart    No future appointments.

## 2023-05-25 ENCOUNTER — APPOINTMENT (OUTPATIENT)
Dept: PHYSICAL THERAPY | Age: 28
End: 2023-05-25
Payer: MEDICAID

## 2023-06-30 ENCOUNTER — CLINICAL DOCUMENTATION (OUTPATIENT)
Dept: PHYSICAL THERAPY | Age: 28
End: 2023-06-30

## 2024-01-24 ENCOUNTER — HOSPITAL ENCOUNTER (OUTPATIENT)
Dept: PHYSICAL THERAPY | Age: 29
Setting detail: RECURRING SERIES
Discharge: HOME | End: 2024-01-27
Payer: MEDICAID

## 2024-01-24 DIAGNOSIS — M54.2 CERVICALGIA: Primary | ICD-10-CM

## 2024-01-24 DIAGNOSIS — M54.2 NECK PAIN: ICD-10-CM

## 2024-01-24 PROCEDURE — 97162 PT EVAL MOD COMPLEX 30 MIN: CPT

## 2024-01-24 NOTE — THERAPY EVALUATION
Madison Arceo  : 1995  Primary: [employer] (Medicaid Managed)  Secondary:  [facility] [place] [address]  [facility] [address]  Phone: [phone]  Fax: [phone] Plan Frequency: 2x a week for up to 90 days (1x a week for pt preference)    Plan of Care/Certification Expiration Date: 24        Plan of Care/Certification Expiration Date:  Plan of Care/Certification Expiration Date: 24    Frequency/Duration: Plan Frequency: 2x a week for up to 90 days (1x a week for pt preference)      Time In/Out:   Time In: 932  Time Out: 951      PT Visit Info:    Plan Frequency: 2x a week for up to 90 days (1x a week for pt preference)  Total # of Visits to Date: 1  Progress Note Counter: 1      Visit Count:  1                OUTPATIENT PHYSICAL THERAPY:             Initial Assessment 2024               Episode (Neck pain )         Treatment Diagnosis:     Cervicalgia  Neck pain  Medical/Referring Diagnosis:    Cervicalgia    Referring Physician:  Filipe Ibrahim MD MD Orders:  PT Eval and Treat   Return MD Appt:    Date of Onset:      Allergies:  Patient has no known allergies.  Restrictions/Precautions:    None      Medications Last Reviewed:  2024     SUBJECTIVE   History of Injury/Illness (Reason for Referral):  Pt arrives to the OPPT clinic w/ neck pain. Pt reports pain has gotten worse. Pt reports having an MRI scheduled but wants to try PT first. Pt reports numbness and tingling, down BUE. Pt reports headaches and BUE UT spasms and has mid back soreness. Pt reports that this all started after giving birth 23. Pt also reports posterior cervical pain that dull achy in nature. Pt denies any specific JUSTUS or trauma incident that is related to this specific incident.     Patient Stated Goal(s):  \"would like to move w/o neck pain\"  Initial Pain Level:       /10   Post Session Pain Level:      /10  Past Medical

## 2024-02-05 ENCOUNTER — HOSPITAL ENCOUNTER (OUTPATIENT)
Dept: PHYSICAL THERAPY | Age: 29
Setting detail: RECURRING SERIES
Discharge: HOME | End: 2024-02-08
Payer: MEDICAID

## 2024-02-05 PROCEDURE — 97140 MANUAL THERAPY 1/> REGIONS: CPT

## 2024-02-05 PROCEDURE — 97110 THERAPEUTIC EXERCISES: CPT

## 2024-02-05 ASSESSMENT — PAIN SCALES - GENERAL: PAINLEVEL_OUTOF10: 6

## 2024-02-05 NOTE — PROGRESS NOTES
Madisonmanuela Arceo  : 1995  Primary: [employer] (Medicaid Managed)  Secondary:  [facility] [place] [address] [place]  [facility] [address]  Phone: [phone]  Fax: [phone] Plan Frequency: 2x a week for up to 90 days (1x a week for pt preference)    Plan of Care/Certification Expiration Date: 24        Plan of Care/Certification Expiration Date:  Plan of Care/Certification Expiration Date: 24    Frequency/Duration:   Plan Frequency: 2x a week for up to 90 days (1x a week for pt preference)      Time In/Out:   Time In: 1015  Time Out: 1100      PT Visit Info:    Total # of Visits to Date: 2  Progress Note Counter: 2      Visit Count:  2    OUTPATIENT PHYSICAL THERAPY:   Treatment Note 2024       Episode  (Neck pain )               Treatment Diagnosis:    Cervicalgia  Neck pain  Medical/Referring Diagnosis:    Cervicalgia   Referring Physician:  Filipe Ibrahim MD MD Orders:  PT Eval and Treat   Return MD Appt:    Date of Onset:  Onset Date:  ( chronic)     Allergies:   Patient has no known allergies.  Restrictions/Precautions:   None      Interventions Planned (Treatment may consist of any combination of the following):     See Assessment Note    Subjective Comments:   \" Had to start back over before having the scan done  Patient reports severe pain and tightness.   Initial Pain Level::     6/10  Post Session Pain Level:       5/10  Medications Last Reviewed:  2024  Updated Objective Findings:  None Today  Treatment   THERAPEUTIC EXERCISE: ( 28 minutes):    Exercises per grid below to improve mobility and strength.  Required minimal verbal and manual cues to promote proper body alignment, promote proper body posture, and promote proper body breathing techniques.    MANUAL THERAPY: ( 15 minutes):   Soft tissue mobilization and stretching to cervical paraspinals and B upper traps  was utilized and necessary because of

## 2024-02-13 NOTE — DISCHARGE SUMMARY
Madison Arceo  : 1995  Primary: [employer] (Medicaid Managed)  Secondary:  [facility] [place] [address] [place] [address]  Phone: [phone]  Fax: [phone] Plan Frequency: 2x a week for up to 90 days (1x a week for pt preference)    Plan of Care/Certification Expiration Date: 24        Plan of Care/Certification Expiration Date:  Plan of Care/Certification Expiration Date: 24    Frequency/Duration: Plan Frequency: 2x a week for up to 90 days (1x a week for pt preference)      Time In/Out:          PT Visit Info:    Plan Frequency: 2x a week for up to 90 days (1x a week for pt preference)  Total # of Visits to Date: 2  Progress Note Counter: 2      Visit Count:  2                OUTPATIENT PHYSICAL THERAPY:             Discharge Summary 2024               Episode (Neck pain )         Treatment Diagnosis:     Cervicalgia  Neck pain  Medical/Referring Diagnosis:        Referring Physician:  Filipe Ibrahim MD MD Orders:  PT Eval and Treat   Return MD Appt:    Date of Onset:      Allergies:  Patient has no known allergies.  Restrictions/Precautions:    None      Medications Last Reviewed:  2024     SUBJECTIVE   History of Injury/Illness (Reason for Referral):  Pt arrives to the OPPT clinic w/ neck pain. Pt reports pain has gotten worse. Pt reports having an MRI scheduled but wants to try PT first. Pt reports numbness and tingling, down BUE. Pt reports headaches and BUE UT spasms and has mid back soreness. Pt reports that this all started after giving birth 23. Pt also reports posterior cervical pain that dull achy in nature. Pt denies any specific JUSTUS or trauma incident that is related to this specific incident.     Patient Stated Goal(s):  \"would like to move w/o neck pain\"  Initial Pain Level:       /10   Post Session Pain Level:      /10  Past Medical History/Comorbidities:   Ms. Arceo  has a

## 2024-02-14 ENCOUNTER — HOSPITAL ENCOUNTER (OUTPATIENT)
Dept: PHYSICAL THERAPY | Age: 29
Setting detail: RECURRING SERIES
End: 2024-02-14
Payer: MEDICAID

## 2024-05-19 DIAGNOSIS — Z30.45 ENCOUNTER FOR SURVEILLANCE OF TRANSDERMAL PATCH HORMONAL CONTRACEPTIVE DEVICE: ICD-10-CM

## 2024-05-20 RX ORDER — NORELGESTROMIN AND ETHINYL ESTRADIOL 150; 35 UG/D; UG/D
PATCH TRANSDERMAL
Qty: 9 PATCH | Refills: 4 | OUTPATIENT
Start: 2024-05-20

## 2025-08-11 ENCOUNTER — TELEPHONE (OUTPATIENT)
Dept: OBGYN CLINIC | Age: 30
End: 2025-08-11

## 2025-08-18 ENCOUNTER — OFFICE VISIT (OUTPATIENT)
Dept: OBGYN CLINIC | Age: 30
End: 2025-08-18
Payer: OTHER GOVERNMENT

## 2025-08-18 ENCOUNTER — PROCEDURE VISIT (OUTPATIENT)
Dept: OBGYN CLINIC | Age: 30
End: 2025-08-18
Payer: MEDICAID

## 2025-08-18 VITALS
WEIGHT: 208.2 LBS | SYSTOLIC BLOOD PRESSURE: 122 MMHG | DIASTOLIC BLOOD PRESSURE: 70 MMHG | BODY MASS INDEX: 32.68 KG/M2 | HEIGHT: 67 IN

## 2025-08-18 DIAGNOSIS — N92.6 MISSED MENSES: Primary | ICD-10-CM

## 2025-08-18 DIAGNOSIS — O09.299 HX OF CERCLAGE, CURRENTLY PREGNANT: ICD-10-CM

## 2025-08-18 DIAGNOSIS — O20.9 VAGINAL BLEEDING AFFECTING EARLY PREGNANCY: ICD-10-CM

## 2025-08-18 DIAGNOSIS — Z98.890 HX OF CERCLAGE, CURRENTLY PREGNANT: ICD-10-CM

## 2025-08-18 DIAGNOSIS — O09.299 HX OF INCOMPETENT CERVIX, CURRENTLY PREGNANT: ICD-10-CM

## 2025-08-18 LAB — HCG SERPL-ACNC: 9488 MIU/ML

## 2025-08-18 PROCEDURE — 99214 OFFICE O/P EST MOD 30 MIN: CPT | Performed by: NURSE PRACTITIONER

## 2025-08-18 PROCEDURE — 76830 TRANSVAGINAL US NON-OB: CPT | Performed by: OBSTETRICS & GYNECOLOGY

## 2025-08-29 ENCOUNTER — OFFICE VISIT (OUTPATIENT)
Dept: OBGYN CLINIC | Age: 30
End: 2025-08-29

## 2025-08-29 ENCOUNTER — PROCEDURE VISIT (OUTPATIENT)
Dept: OBGYN CLINIC | Age: 30
End: 2025-08-29

## 2025-08-29 VITALS
WEIGHT: 204 LBS | BODY MASS INDEX: 32.02 KG/M2 | SYSTOLIC BLOOD PRESSURE: 122 MMHG | DIASTOLIC BLOOD PRESSURE: 60 MMHG | HEIGHT: 67 IN

## 2025-08-29 DIAGNOSIS — Z32.01 POSITIVE BLOOD PREGNANCY TEST: ICD-10-CM

## 2025-08-29 DIAGNOSIS — N92.6 MISSED PERIOD: Primary | ICD-10-CM

## 2025-08-29 DIAGNOSIS — R76.8 POSITIVE ANA (ANTINUCLEAR ANTIBODY): ICD-10-CM

## 2025-08-29 DIAGNOSIS — O21.9 NAUSEA AND VOMITING IN PREGNANCY: ICD-10-CM

## 2025-08-29 DIAGNOSIS — O09.299 HX OF INCOMPETENT CERVIX, CURRENTLY PREGNANT: ICD-10-CM

## 2025-08-29 DIAGNOSIS — O09.211 CURRENT PREGNANCY WITH HISTORY OF PRE-TERM LABOR IN FIRST TRIMESTER: ICD-10-CM

## 2025-08-29 DIAGNOSIS — O36.80X0 ENCOUNTER TO DETERMINE FETAL VIABILITY OF PREGNANCY, SINGLE OR UNSPECIFIED FETUS: Primary | ICD-10-CM

## 2025-08-29 DIAGNOSIS — Z86.2 HISTORY OF THROMBOCYTOPENIA: ICD-10-CM

## 2025-08-29 DIAGNOSIS — O09.299 HX OF CERCLAGE, CURRENTLY PREGNANT: ICD-10-CM

## 2025-08-29 DIAGNOSIS — Z98.890 HX OF CERCLAGE, CURRENTLY PREGNANT: ICD-10-CM

## 2025-08-29 RX ORDER — ONDANSETRON 8 MG/1
8 TABLET, FILM COATED ORAL EVERY 8 HOURS PRN
Qty: 30 TABLET | Refills: 1 | Status: SHIPPED | OUTPATIENT
Start: 2025-08-29

## (undated) DEVICE — STERILE POLYISOPRENE POWDER-FREE SURGICAL GLOVES: Brand: PROTEXIS

## (undated) DEVICE — X-RAY SPONGES,12 PLY: Brand: DERMACEA

## (undated) DEVICE — SOLUTION IRRIG 1000ML H2O STRL BLT

## (undated) DEVICE — LITHOTOMY: Brand: MEDLINE INDUSTRIES, INC.

## (undated) DEVICE — DRAPE TWL SURG 16X26IN BLU ORB04] ALLCARE INC]

## (undated) DEVICE — PREMIUM WET SKIN PREP TRAY: Brand: MEDLINE INDUSTRIES, INC.

## (undated) DEVICE — GOWN,REINF,POLY,ECL,PP SLV,XL: Brand: MEDLINE

## (undated) DEVICE — Z INACTIVE USE 2527070 DRAPE SURG W40XL44IN UNDERBUTTOCK SMS POLYPR W/ PCH BK DISP

## (undated) DEVICE — SUTURE PROL 2 L60IN NONABSORBABLE BLU TP 1 L65MM 1 2 CIR 8825G